# Patient Record
Sex: FEMALE | Race: WHITE | NOT HISPANIC OR LATINO | Employment: STUDENT | URBAN - METROPOLITAN AREA
[De-identification: names, ages, dates, MRNs, and addresses within clinical notes are randomized per-mention and may not be internally consistent; named-entity substitution may affect disease eponyms.]

---

## 2017-11-09 ENCOUNTER — HOSPITAL ENCOUNTER (EMERGENCY)
Facility: HOSPITAL | Age: 27
Discharge: HOME | End: 2017-11-09
Attending: EMERGENCY MEDICINE
Payer: COMMERCIAL

## 2017-11-09 VITALS
WEIGHT: 110 LBS | HEIGHT: 66 IN | DIASTOLIC BLOOD PRESSURE: 70 MMHG | TEMPERATURE: 98 F | HEART RATE: 82 BPM | BODY MASS INDEX: 17.68 KG/M2 | SYSTOLIC BLOOD PRESSURE: 119 MMHG | RESPIRATION RATE: 20 BRPM | OXYGEN SATURATION: 100 %

## 2017-11-09 DIAGNOSIS — S02.5XXA CLOSED FRACTURE OF TOOTH, INITIAL ENCOUNTER: ICD-10-CM

## 2017-11-09 DIAGNOSIS — D64.9 ANEMIA, UNSPECIFIED TYPE: ICD-10-CM
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
## 2017-11-09 DIAGNOSIS — R55 VASOVAGAL EPISODE: ICD-10-CM

## 2017-11-09 DIAGNOSIS — R55 SYNCOPE: ICD-10-CM

## 2017-11-09 DIAGNOSIS — S01.511A LIP LACERATION, INITIAL ENCOUNTER: Primary | ICD-10-CM

## 2017-11-09 LAB
ALBUMIN SERPL BCP-MCNC: 4 G/DL
ALP SERPL-CCNC: 67 U/L
ALT SERPL W/O P-5'-P-CCNC: 28 U/L
ANION GAP SERPL CALC-SCNC: 12 MMOL/L
AST SERPL-CCNC: 42 U/L
B-HCG UR QL: NEGATIVE
BASOPHILS # BLD AUTO: 0.04 K/UL
BASOPHILS NFR BLD: 0.4 %
BILIRUB SERPL-MCNC: 0.2 MG/DL
BUN SERPL-MCNC: 11 MG/DL
CALCIUM SERPL-MCNC: 9.6 MG/DL
CHLORIDE SERPL-SCNC: 108 MMOL/L
CO2 SERPL-SCNC: 17 MMOL/L
CREAT SERPL-MCNC: 0.7 MG/DL
CTP QC/QA: YES
DIFFERENTIAL METHOD: ABNORMAL
EOSINOPHIL # BLD AUTO: 0.1 K/UL
EOSINOPHIL NFR BLD: 0.9 %
ERYTHROCYTE [DISTWIDTH] IN BLOOD BY AUTOMATED COUNT: 19.9 %
EST. GFR  (AFRICAN AMERICAN): >60 ML/MIN/1.73 M^2
EST. GFR  (NON AFRICAN AMERICAN): >60 ML/MIN/1.73 M^2
GLUCOSE SERPL-MCNC: 81 MG/DL
HCT VFR BLD AUTO: 29.1 %
HGB BLD-MCNC: 8.6 G/DL
IMM GRANULOCYTES # BLD AUTO: 0.03 K/UL
IMM GRANULOCYTES NFR BLD AUTO: 0.3 %
LYMPHOCYTES # BLD AUTO: 1.2 K/UL
LYMPHOCYTES NFR BLD: 11.8 %
MCH RBC QN AUTO: 18.1 PG
MCHC RBC AUTO-ENTMCNC: 29.6 G/DL
MCV RBC AUTO: 61 FL
MONOCYTES # BLD AUTO: 0.6 K/UL
MONOCYTES NFR BLD: 5.8 %
NEUTROPHILS # BLD AUTO: 8.4 K/UL
NEUTROPHILS NFR BLD: 80.8 %
NRBC BLD-RTO: 0 /100 WBC
PLATELET # BLD AUTO: 216 K/UL
PMV BLD AUTO: ABNORMAL FL
POTASSIUM SERPL-SCNC: 4.2 MMOL/L
PROT SERPL-MCNC: 7.4 G/DL
RBC # BLD AUTO: 4.74 M/UL
SODIUM SERPL-SCNC: 137 MMOL/L
WBC # BLD AUTO: 10.35 K/UL

## 2017-11-09 PROCEDURE — 93010 ELECTROCARDIOGRAM REPORT: CPT | Mod: ,,, | Performed by: INTERNAL MEDICINE

## 2017-11-09 PROCEDURE — 96374 THER/PROPH/DIAG INJ IV PUSH: CPT

## 2017-11-09 PROCEDURE — 25000003 PHARM REV CODE 250: Performed by: STUDENT IN AN ORGANIZED HEALTH CARE EDUCATION/TRAINING PROGRAM

## 2017-11-09 PROCEDURE — 80053 COMPREHEN METABOLIC PANEL: CPT

## 2017-11-09 PROCEDURE — 81025 URINE PREGNANCY TEST: CPT | Performed by: STUDENT IN AN ORGANIZED HEALTH CARE EDUCATION/TRAINING PROGRAM

## 2017-11-09 PROCEDURE — 12001 RPR S/N/AX/GEN/TRNK 2.5CM/<: CPT | Mod: ,,, | Performed by: EMERGENCY MEDICINE

## 2017-11-09 PROCEDURE — 12011 RPR F/E/E/N/L/M 2.5 CM/<: CPT

## 2017-11-09 PROCEDURE — 85025 COMPLETE CBC W/AUTO DIFF WBC: CPT

## 2017-11-09 PROCEDURE — 99284 EMERGENCY DEPT VISIT MOD MDM: CPT | Mod: 25

## 2017-11-09 PROCEDURE — 63600175 PHARM REV CODE 636 W HCPCS: Performed by: STUDENT IN AN ORGANIZED HEALTH CARE EDUCATION/TRAINING PROGRAM

## 2017-11-09 PROCEDURE — 99284 EMERGENCY DEPT VISIT MOD MDM: CPT | Mod: 25,,, | Performed by: EMERGENCY MEDICINE

## 2017-11-09 RX ORDER — FERROUS SULFATE 325(65) MG
325 TABLET ORAL DAILY
Qty: 30 TABLET | Refills: 0 | Status: SHIPPED | OUTPATIENT
Start: 2017-11-09 | End: 2019-04-10

## 2017-11-09 RX ORDER — LIDOCAINE HYDROCHLORIDE 10 MG/ML
5 INJECTION INFILTRATION; PERINEURAL ONCE
Status: COMPLETED | OUTPATIENT
Start: 2017-11-09 | End: 2017-11-09

## 2017-11-09 RX ORDER — HYDROCODONE BITARTRATE AND ACETAMINOPHEN 5; 325 MG/1; MG/1
1 TABLET ORAL EVERY 6 HOURS PRN
Qty: 12 TABLET | Refills: 0 | Status: SHIPPED | OUTPATIENT
Start: 2017-11-09 | End: 2019-04-10

## 2017-11-09 RX ORDER — MORPHINE SULFATE 4 MG/ML
4 INJECTION, SOLUTION INTRAMUSCULAR; INTRAVENOUS
Status: COMPLETED | OUTPATIENT
Start: 2017-11-09 | End: 2017-11-09

## 2017-11-09 RX ORDER — IBUPROFEN 600 MG/1
600 TABLET ORAL EVERY 6 HOURS PRN
Qty: 20 TABLET | Refills: 0 | Status: SHIPPED | OUTPATIENT
Start: 2017-11-09 | End: 2019-04-10

## 2017-11-09 RX ORDER — LIDOCAINE HYDROCHLORIDE 20 MG/ML
5 INJECTION, SOLUTION EPIDURAL; INFILTRATION; INTRACAUDAL; PERINEURAL
Status: DISCONTINUED | OUTPATIENT
Start: 2017-11-09 | End: 2017-11-09

## 2017-11-09 RX ORDER — MUPIROCIN 20 MG/G
1 OINTMENT TOPICAL
Status: COMPLETED | OUTPATIENT
Start: 2017-11-09 | End: 2017-11-09

## 2017-11-09 RX ORDER — AMOXICILLIN 875 MG/1
875 TABLET, FILM COATED ORAL 2 TIMES DAILY
Qty: 14 TABLET | Refills: 0 | Status: SHIPPED | OUTPATIENT
Start: 2017-11-09 | End: 2019-04-10

## 2017-11-09 RX ADMIN — LIDOCAINE HYDROCHLORIDE 5 ML: 10 INJECTION, SOLUTION INFILTRATION; PERINEURAL at 05:11

## 2017-11-09 RX ADMIN — MORPHINE SULFATE 4 MG: 4 INJECTION INTRAVENOUS at 04:11

## 2017-11-09 RX ADMIN — MUPIROCIN 22 G: 20 OINTMENT TOPICAL at 05:11

## 2017-11-09 NOTE — ED TRIAGE NOTES
Pt woke up from sharp RLQ pain,got up and went to the bathroom,on her way to her bedroom she blacked out and hit her on the coffee table. +lacertaion on her upper lip      LOC: The patient is awake, alert, aware of environment with an appropriate affect. Oriented x4, speaking appropriately  APPEARANCE: Pt resting comfortably, in no acute distress, pt is clean and well groomed, clothing properly fastened  SKIN:The skin is warm and dry, color consistent with ethnicity, patient has normal skin turgor and moist mucus membranes, laceration on her upper lip  RESPIRATORY:Airway is open and patent, respirations are spontaneous, patient has a normal effort and rate, no accessory muscle use noted.  CARDIAC: Normal rate and rhythm, no peripheral edema noted, capillary refill < 3 seconds, bilateral radial pulses 2+.  ABDOMEN: Soft, non tender, non distended.   NEUROLOGIC: PERRLA, facial expression is symmetrical, patient moving all extremities spontaneously, normal sensation in all extremities when touched with a finger.  Follows all commands appropriately  MUSCULOSKELETAL: Patient moving all extremities spontaneously, no obvious swelling or deformities noted.

## 2017-11-09 NOTE — ED PROVIDER NOTES
Encounter Date: 11/9/2017       History     Chief Complaint   Patient presents with    Fall     Fell tonight while walking to the bathroom. Pt states that she blacked out while she was walking, fell and cut her lip on her coffee table and knocked out two teeth.     Abdominal Pain     26 year old woman with no significant medical history presenting for a reported syncopal episode resulting in a fall with trauma to the face. The patient reported that she woke up overnight with sudden and severe pain in the right lower abdomen and suprapubic area. She arose from bed and the pain grew worse. She suddenly became dizzy and lightheaded and then fell down. Her right upper lip and right maxillary teeth made contact with the edge of a coffee table. The patient remained down for an unclear period of time. She no longer complains of abdominal pain at this time. She is distressed about her broken teeth and lip laceration. She has not experienced a syncope before and has no seizure history. She stated that she has not been sexually active. Her last menstrual period occurred approximately 3 weeks ago. She reported that she has strong menstrual cramps, but that the pain she experienced was far more severe and short-lasting. She denies fevers/chills, nausea/vomiting, recent illness, and food exposure.          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   HENT: Negative for  congestion.    Eyes: Negative for visual disturbance.   Respiratory: Negative for shortness of breath.    Cardiovascular: Negative for chest pain and palpitations.   Gastrointestinal: Negative for abdominal pain and nausea.   Endocrine: Negative for polyuria.   Genitourinary: Negative for difficulty urinating.   Musculoskeletal: Negative for arthralgias.   Skin: Positive for wound.   Neurological: Positive for dizziness.   Psychiatric/Behavioral: Negative for agitation and behavioral problems.       Physical Exam     Initial Vitals [11/09/17 0311]   BP Pulse Resp Temp SpO2   -- (!) 113 18 98.4 °F (36.9 °C) 99 %      MAP       --         Physical Exam    Nursing note and vitals reviewed.  Constitutional: She appears well-developed and well-nourished. She is not diaphoretic. She appears distressed.   Distressed appearing 26 year old  woman.   HENT:   2.0 cm laceration to right maxillary lip.  Right central incisor broken in half.  Right lateral incisor missing.  Right canine missing.    Areas of laceration and broken teeth hemostatic at this time. No evidence of foreign bodies in mouth.   Eyes: Conjunctivae and EOM are normal. Pupils are equal, round, and reactive to light. Right eye exhibits no discharge. Left eye exhibits no discharge.   Neck: Normal range of motion. Neck supple.   Cardiovascular: Normal rate, regular rhythm, normal heart sounds and intact distal pulses.   Pulmonary/Chest: Breath sounds normal. No respiratory distress. She has no wheezes.   Abdominal: Soft. Bowel sounds are normal. She exhibits no distension.   No tenderness to palpation in right lower quadrant, left lower quadrant, or suprapubic area. Abdomen soft. Bowel sounds normal.   Musculoskeletal: Normal range of motion. She exhibits no edema or tenderness.         ED Course   Lac Repair  Date/Time: 11/9/2017 5:12 AM  Performed by: SHANTE DC  Authorized by: LAKESHA LACEY   Consent Done: Not Needed  Location: Maxillary  lip (right)  Laceration length: 1.5 cm  Foreign bodies: no foreign bodies  Tendon involvement: none  Nerve involvement: none  Vascular damage: no  Anesthesia: local infiltration    Anesthesia:  Local Anesthetic: lidocaine 1% without epinephrine  Anesthetic total: 2 mL  Patient sedated: no  Preparation: Patient was prepped and draped in the usual sterile fashion.  Irrigation solution: saline  Amount of cleaning: standard  Wound skin closure material used: 5-0 chromic.  Number of sutures: 4  Technique: simple  Approximation: close  Approximation difficulty: simple  Dressing: antibiotic ointment  Patient tolerance: Patient tolerated the procedure well with no immediate complications        Labs Reviewed   CBC W/ AUTO DIFFERENTIAL   COMPREHENSIVE METABOLIC PANEL   POCT URINE PREGNANCY             Medical Decision Making:   Initial Assessment:   26 year old woman with no significant past medical history presenting for reported syncopal episode induced by sudden suprapubic/right lower quadrant abdominal pain. Patient sustained facial trauma as noted above. CT head and max/face pending. Lidocaine with epinephrine ordered for lip laceration. CBC, CMP, ECG, and UPT pending. Presentation concerning for lip laceration vs. Several broken teeth vs. Facial bone fracture vs. Ruptured ovarian cyst vs. Pregnancy. Awaiting labs and imaging. Laceration to be repaired shortly.    Manny Carey MD  [facility]  11/09/2017; 0401  Differential Diagnosis:   CT max/face demonstrated small retained foreign body in soft tissue of right maxilla that could not be visualized during laceration repair. Patient advised that retained foreign body could not be visualized and that it most likely would be walled off with time. Patient voiced understanding. Patient advised regarding negative findings on CT head. Patient advised furthermore that she is mildly anemic according to her H/H and that this may be secondary to menstruation. She was given  prescriptions for PO Iron Sulfate, PO Motrin, PO Norco, and PO Amoxicillin with ambulatory referral placed to dental service. Patient advised that the etiology of her sudden and fleeting right adnexal pain may have been a ruptured ovarian cyst. Her syncope was likely a vasovagal response secondary to her pain. Patient voiced understanding. Patient will be discharged at this time to home in stable condition.    Manny Carey MD  \[facility]\"" EM  11/09/2017; 0538              Attending Attestation:   Physician Attestation Statement for Resident:  As the supervising MD   Physician Attestation Statement: I have personally seen and examined this patient.   I agree with the above history. -:   As the supervising MD I agree with the above PE.    As the supervising MD I agree with the above treatment, course, plan, and disposition.  I was personally present during the critical portions of the procedure(s) performed by the resident and was immediately available in the ED to provide services and assistance as needed during the entire procedure.                    ED Course      Clinical Impression:   The primary encounter diagnosis was Lip laceration, initial encounter. Diagnoses of Syncope and Closed fracture of tooth, initial encounter were also pertinent to this visit.                           Manny Carey MD  Resident  11/09/17 0539       Manny Carey MD  Resident  11/09/17 0540       Manny Carey MD  Resident  11/09/17 0548       Tobias Christopher MD  11/09/17 3184

## 2017-11-09 NOTE — PROVIDER PROGRESS NOTES - EMERGENCY DEPT.
Encounter Date: 11/9/2017    ED Physician Progress Notes           Resident Attestation:   Patient with pain consistent with ruptured ovarian cyst, subsequent syncopal episode consistent with vasovagal. No abdominal pain now. C/o dental fracture and facial laceration.   On exam, benign abdomen, clean laceration to her right upper lip.   Reviewed studies. She is anemic, likely from menstruation. No hx to suggest GI bleed. She has a very small retained foreign body to her lip. I reviewed the findings with the patient and she understands to return with worsening symptoms and follow up with dentist. Will start on iron.     I, Analia Osorio, am scribing for, and in the presence of, Dr. Christopher. I performed the above scribed service and the documentation accurately describes the services I performed. I attest to the accuracy of the note.

## 2019-04-10 ENCOUNTER — OFFICE VISIT (OUTPATIENT)
Dept: URGENT CARE | Facility: CLINIC | Age: 29
End: 2019-04-10

## 2019-04-10 VITALS
HEIGHT: 66 IN | DIASTOLIC BLOOD PRESSURE: 72 MMHG | SYSTOLIC BLOOD PRESSURE: 115 MMHG | WEIGHT: 110 LBS | OXYGEN SATURATION: 100 % | RESPIRATION RATE: 19 BRPM | TEMPERATURE: 98 F | HEART RATE: 110 BPM | BODY MASS INDEX: 17.68 KG/M2

## 2019-04-10 DIAGNOSIS — L02.416 ABSCESS OF LEFT THIGH: Primary | ICD-10-CM

## 2019-04-10 PROCEDURE — 99214 OFFICE O/P EST MOD 30 MIN: CPT | Mod: S$GLB,,, | Performed by: FAMILY MEDICINE

## 2019-04-10 PROCEDURE — 99214 PR OFFICE/OUTPT VISIT, EST, LEVL IV, 30-39 MIN: ICD-10-PCS | Mod: S$GLB,,, | Performed by: FAMILY MEDICINE

## 2019-04-10 RX ORDER — FLUOXETINE HYDROCHLORIDE 20 MG/1
20 CAPSULE ORAL DAILY
Status: ON HOLD | COMMUNITY
End: 2021-11-25 | Stop reason: HOSPADM

## 2019-04-10 RX ORDER — MUPIROCIN 20 MG/G
OINTMENT TOPICAL 3 TIMES DAILY
Qty: 1 TUBE | Refills: 0 | Status: SHIPPED | OUTPATIENT
Start: 2019-04-10 | End: 2019-04-20

## 2019-04-10 RX ORDER — SULFAMETHOXAZOLE AND TRIMETHOPRIM 800; 160 MG/1; MG/1
1 TABLET ORAL 2 TIMES DAILY
Qty: 20 TABLET | Refills: 0 | Status: SHIPPED | OUTPATIENT
Start: 2019-04-10 | End: 2019-04-20

## 2019-04-10 RX ORDER — IBUPROFEN 800 MG/1
800 TABLET ORAL 3 TIMES DAILY
Qty: 30 TABLET | Refills: 0 | Status: SHIPPED | OUTPATIENT
Start: 2019-04-10 | End: 2019-04-20

## 2019-04-11 NOTE — PATIENT INSTRUCTIONS
Abscess (Incision & Drainage)  An abscess is sometimes called a boil. It happens when bacteria get trapped under the skin and start to grow. Pus forms inside the abscess as the body responds to the bacteria. An abscess can happen with an insect bite, ingrown hair, blocked oil gland, pimple, cyst, or puncture wound.  Your healthcare provider has drained the pus from your abscess. If the abscess pocket was large, your healthcare provider may have put in gauze packing. Your provider will need to remove it on your next visit. He or she may also replace it at that time. You may not need antibiotics to treat a simple abscess, unless the infection is spreading into the skin around the wound (cellulitis).  The wound will take about 1 to 2 weeks to heal, depending on the size of the abscess. Healthy tissue will grow from the bottom and sides of the opening until it seals over.  Home care  These tips can help your wound heal:  · The wound may drain for the first 2 days. Cover the wound with a clean dry dressing. Change the dressing if it becomes soaked with blood or pus.  · If a gauze packing was placed inside the abscess pocket, you may be told to remove it yourself. You may do this in the shower. Once the packing is removed, you should wash the area in the shower, or clean the area as directed by your provider. Continue to do this until the skin opening has closed. Make sure you wash your hands after changing the packing or cleaning the wound.  · If you were prescribed antibiotics, take them as directed until they are all gone.  · You may use acetaminophen or ibuprofen to control pain, unless another pain medicine was prescribed. If you have liver disease or ever had a stomach ulcer, talk with your doctor before using these medicines.  Follow-up care  Follow up with your healthcare provider, or as advised. If a gauze packing was put in your wound, it should be removed in 1 to 2 days. Check your wound every day for any  signs that the infection is getting worse. The signs are listed below.  When to seek medical advice  Call your healthcare provider right away if any of these occur:  · Increasing redness or swelling  · Red streaks in the skin leading away from the wound  · Increasing local pain or swelling  · Continued pus draining from the wound 2 days after treatment  · Fever of 100.4ºF (38ºC) or higher, or as directed by your healthcare provider  · Boil returns when you are at home  Date Last Reviewed: 9/1/2016  © 6821-2731 [employer]. [address] [place] [id number]. All rights reserved. This information is not intended as a substitute for professional medical care. Always follow your healthcare professional's instructions.    Follow up in 2 days.    Reggie Lombardi MD

## 2019-04-11 NOTE — PROGRESS NOTES
"Subjective:       Patient ID: Georgia Lares is a 28 y.o. female.    Vitals:  height is 5' 6" (1.676 m) and weight is 49.9 kg (110 lb). Her oral temperature is 98.2 °F (36.8 °C). Her blood pressure is 115/72 and her pulse is 110. Her respiration is 19 and oxygen saturation is 100%.     Chief Complaint: Cyst    Large red cyst on upper left leg. It has recently been bleeding on its own.    Cyst   This is a new problem. The current episode started in the past 7 days (2-3 days). The problem occurs constantly. The problem has been gradually worsening. Pertinent negatives include no abdominal pain, anorexia, arthralgias, change in bowel habit, chest pain, chills, congestion, coughing, diaphoresis, fatigue, fever, headaches, joint swelling, myalgias, nausea, neck pain, numbness, rash, sore throat, swollen glands, urinary symptoms, vertigo, visual change, vomiting or weakness. The symptoms are aggravated by walking (Sitting). She has tried nothing for the symptoms. The treatment provided no relief.       Constitution: Negative for chills, sweating, fatigue and fever.   HENT: Negative for congestion and sore throat.    Neck: Negative for neck pain and painful lymph nodes.   Cardiovascular: Negative for chest pain and leg swelling.   Eyes: Negative for double vision and blurred vision.   Respiratory: Negative for cough and shortness of breath.    Gastrointestinal: Negative for abdominal pain, nausea, vomiting and diarrhea.   Genitourinary: Negative for dysuria, frequency, urgency and history of kidney stones.   Musculoskeletal: Negative for joint pain, joint swelling, muscle cramps and muscle ache.   Skin: Negative for color change, pale, rash and bruising.   Allergic/Immunologic: Negative for seasonal allergies.   Neurological: Negative for dizziness, history of vertigo, light-headedness, passing out, headaches and numbness.   Hematologic/Lymphatic: Negative for swollen lymph nodes.   Psychiatric/Behavioral: Negative " for nervous/anxious, sleep disturbance and depression. The patient is not nervous/anxious.        Objective:      Physical Exam   Constitutional: She is oriented to person, place, and time. She appears well-developed and well-nourished.   HENT:   Head: Normocephalic and atraumatic.   Eyes: Pupils are equal, round, and reactive to light. EOM are normal.   Neck: Normal range of motion. Neck supple. No thyromegaly present.   Cardiovascular: Normal rate, regular rhythm and normal heart sounds. Exam reveals no friction rub.   No murmur heard.  Pulmonary/Chest: Breath sounds normal. No respiratory distress. She has no wheezes. She has no rales.   Abdominal: Soft. Bowel sounds are normal. She exhibits no distension. There is no tenderness.   Musculoskeletal: Normal range of motion. She exhibits no edema, tenderness or deformity.   Lymphadenopathy:     She has no cervical adenopathy.   Neurological: She is alert and oriented to person, place, and time. No cranial nerve deficit. She exhibits normal muscle tone. Coordination normal.   Skin: Skin is warm. Capillary refill takes less than 2 seconds. No rash noted. No pallor.        Left thigh has a 3 cm of swelling, redness, tenderness and warm.  The center has a 1 mm opening with scant yellow drainage.   Psychiatric: She has a normal mood and affect. Her behavior is normal. Judgment and thought content normal.   Vitals reviewed.      Assessment:       1. Abscess of left thigh        Plan:         Abscess of left thigh  -     sulfamethoxazole-trimethoprim 800-160mg (BACTRIM DS) 800-160 mg Tab; Take 1 tablet by mouth 2 (two) times daily. for 10 days  Dispense: 20 tablet; Refill: 0  -     mupirocin (BACTROBAN) 2 % ointment; Apply topically 3 (three) times daily. Apply to the affected area for 10 days  Dispense: 1 Tube; Refill: 0  -     ibuprofen (ADVIL,MOTRIN) 800 MG tablet; Take 1 tablet (800 mg total) by mouth 3 (three) times daily. for 10 days  Dispense: 30 tablet; Refill:  0          Patient Instructions     Abscess (Incision & Drainage)  An abscess is sometimes called a boil. It happens when bacteria get trapped under the skin and start to grow. Pus forms inside the abscess as the body responds to the bacteria. An abscess can happen with an insect bite, ingrown hair, blocked oil gland, pimple, cyst, or puncture wound.  Your healthcare provider has drained the pus from your abscess. If the abscess pocket was large, your healthcare provider may have put in gauze packing. Your provider will need to remove it on your next visit. He or she may also replace it at that time. You may not need antibiotics to treat a simple abscess, unless the infection is spreading into the skin around the wound (cellulitis).  The wound will take about 1 to 2 weeks to heal, depending on the size of the abscess. Healthy tissue will grow from the bottom and sides of the opening until it seals over.  Home care  These tips can help your wound heal:  · The wound may drain for the first 2 days. Cover the wound with a clean dry dressing. Change the dressing if it becomes soaked with blood or pus.  · If a gauze packing was placed inside the abscess pocket, you may be told to remove it yourself. You may do this in the shower. Once the packing is removed, you should wash the area in the shower, or clean the area as directed by your provider. Continue to do this until the skin opening has closed. Make sure you wash your hands after changing the packing or cleaning the wound.  · If you were prescribed antibiotics, take them as directed until they are all gone.  · You may use acetaminophen or ibuprofen to control pain, unless another pain medicine was prescribed. If you have liver disease or ever had a stomach ulcer, talk with your doctor before using these medicines.  Follow-up care  Follow up with your healthcare provider, or as advised. If a gauze packing was put in your wound, it should be removed in 1 to 2 days.  Check your wound every day for any signs that the infection is getting worse. The signs are listed below.  When to seek medical advice  Call your healthcare provider right away if any of these occur:  · Increasing redness or swelling  · Red streaks in the skin leading away from the wound  · Increasing local pain or swelling  · Continued pus draining from the wound 2 days after treatment  · Fever of 100.4ºF (38ºC) or higher, or as directed by your healthcare provider  · Boil returns when you are at home  Date Last Reviewed: 9/1/2016 © 2000-2017 [email or web address]. [address] [place] [address]. All rights reserved. This information is not intended as a substitute for professional medical care. Always follow your healthcare professional's instructions.    Follow up in 2 days.    Reggie Lombardi MD

## 2019-04-12 ENCOUNTER — CLINICAL SUPPORT (OUTPATIENT)
Dept: URGENT CARE | Facility: CLINIC | Age: 29
End: 2019-04-12

## 2019-04-12 VITALS
TEMPERATURE: 98 F | RESPIRATION RATE: 16 BRPM | HEIGHT: 66 IN | DIASTOLIC BLOOD PRESSURE: 66 MMHG | WEIGHT: 110 LBS | OXYGEN SATURATION: 98 % | BODY MASS INDEX: 17.68 KG/M2 | HEART RATE: 97 BPM | SYSTOLIC BLOOD PRESSURE: 106 MMHG

## 2019-04-12 DIAGNOSIS — L02.31 ABSCESS OF LEFT BUTTOCK: Primary | ICD-10-CM

## 2019-04-12 PROCEDURE — 99499 UNLISTED E&M SERVICE: CPT | Mod: S$GLB,,, | Performed by: PHYSICIAN ASSISTANT

## 2019-04-12 PROCEDURE — 99499 NO LOS: ICD-10-PCS | Mod: S$GLB,,, | Performed by: PHYSICIAN ASSISTANT

## 2019-04-12 RX ORDER — CLINDAMYCIN PHOSPHATE 150 MG/ML
300 INJECTION, SOLUTION INTRAVENOUS
Status: COMPLETED | OUTPATIENT
Start: 2019-04-12 | End: 2019-04-12

## 2019-04-12 RX ADMIN — CLINDAMYCIN PHOSPHATE 300 MG: 150 INJECTION, SOLUTION INTRAVENOUS at 07:04

## 2019-04-12 NOTE — PROGRESS NOTES
"Subjective:       Patient ID: Georgia Lares is a 28 y.o. female.    Vitals:  height is 5' 6" (1.676 m) and weight is 49.9 kg (110 lb). Her oral temperature is 97.7 °F (36.5 °C). Her blood pressure is 106/66 and her pulse is 97. Her respiration is 16 and oxygen saturation is 98%.     Chief Complaint: Wound Check    Pt had an abscess under right buttocks drained on Wednesday. Patient states that she is feeling much better. Denies any fever.    Wound Check   She was originally treated 3 to 5 days ago. Previous treatment included I&D of abscess and oral antibiotics. Her temperature was unmeasured prior to arrival. There has been bloody and colored discharge from the wound. There is no redness present. There is no swelling present. The pain has improved. She has no difficulty moving the affected extremity or digit.       Constitution: Negative for fever.   Neck: Negative for painful lymph nodes.   Skin: Positive for erythema.   Hematologic/Lymphatic: Negative for swollen lymph nodes.       Objective:      Physical Exam   Constitutional: She is oriented to person, place, and time. She appears well-developed and well-nourished.   HENT:   Head: Normocephalic and atraumatic. Head is without abrasion, without contusion and without laceration.   Right Ear: External ear normal.   Left Ear: External ear normal.   Nose: Nose normal.   Mouth/Throat: Oropharynx is clear and moist.   Eyes: Pupils are equal, round, and reactive to light. Conjunctivae, EOM and lids are normal.   Neck: Trachea normal, full passive range of motion without pain and phonation normal. Neck supple.   Cardiovascular: Normal rate, regular rhythm and normal heart sounds.   Pulmonary/Chest: Effort normal and breath sounds normal. No stridor. No respiratory distress.   Musculoskeletal: Normal range of motion.   Neurological: She is alert and oriented to person, place, and time.   Skin: Skin is warm, dry and intact. Capillary refill takes less than 2 " seconds. No abrasion, no bruising, no burn, no ecchymosis, no laceration, no lesion and no rash noted. There is erythema.        Psychiatric: She has a normal mood and affect. Her speech is normal and behavior is normal. Judgment and thought content normal. Cognition and memory are normal.   Nursing note and vitals reviewed.      Assessment:       1. Abscess of left buttock        Plan:         Abscess of left buttock  -     clindamycin injection 300 mg      Patient Instructions   General Discharge Instructions    Wash with warm soap and water and apply Bactroban ointment on the affected area. Change dressings every 12hrs.    If you were prescribed antibiotics, please take them to completion.  You must understand that you've received an Urgent Care treatment only and that you may be released before all your medical problems are known or treated. You, the patient, will arrange for follow up care as instructed.  Follow up with your PCP or specialty clinic as directed in the next 1-2 weeks if not improved or as needed.  You can call [phone] to schedule an appointment with the appropriate provider.  If your condition worsens we recommend that you receive another evaluation at the emergency room immediately or contact your primary medical clinics after hours call service to discuss your concerns.  Please return here or go to the Emergency Department for any concerns or worsening of condition.      Wound Care After Packing Removal or Replacement  Packing is a special type of dressing placed inside a wound to help it heal. Once the packing is removed, you need to care for your wound. Good wound care helps prevent infection. Be sure to keep all follow-up appointments with your healthcare provider. Follow these instructions to take care of the wound once youre at home.  Home care  Your healthcare provider may prescribe medicines for pain. Or he or she may suggest an over-the-counter (OTC) pain reliever, such as  ibuprofen or acetaminophen. Talk with your healthcare provider before taking any OTC medicines if you have chronic liver or kidney disease, a stomach ulcer, or gastrointestinal bleeding. In certain cases, you may also need to take antibiotics to help prevent infection. If so, take them exactly as directed for as long as directed. Dont stop taking your antibiotics until they are all gone, even if you feel better.  Here are some general care guidelines for your wound:  · Follow your healthcare providers instructions on how to care for your wound. Always wash your hands with soap and warm water before and after tending to your wound.  · If a bandage was put on, remove and change it once a day or as directed. If the bandage gets wet or dirty, replace it as soon as possible with a new bandage. Use a clean cloth to gently pat the wound dry.  · If your packing was replaced, a small piece of gauze may hang from the wound. It allows fluid to continue draining from the wound. You may need to use an ointment or cream to keep the packing from sticking to the bandage.  · Don't bathe in a tub or soak your wound until your healthcare provider says its OK. Take showers or sponge baths instead. Avoid swimming.  · Dont scratch, rub, scrub, or pick your wound.  · Check your wound daily for the signs of infection listed below.  If your wound was closed, it was likely with one of four types of closures. These include stitches (sutures), strips of surgical tape, skin glue, and staples. Your healthcare provider will decide on the best closure based on the size and location of your wound. Each type of closure needs specific care.  · Sutures. You may want to clean the wound daily after the first 2 to 3 days. To do this, remove the bandage and gently wash the area with soap and warm water. After cleaning, apply a thin layer of antibiotic ointment if recommended. Then put on a new bandage. Sutures on the outside of the skin usually need  to be removed by your healthcare provider.  · Surgical tape. Keep the area dry. If it gets wet, blot it dry with a towel. Surgical tape closures usually fall off within 7 to 10 days. If they have not fallen off after 10 days, you can remove them yourself. To remove the tape, use mineral oil or petroleum jelly on a cotton ball to gently rub the adhesive.  · Skin glue. You may shower or bathe as usual. But do not use soaps, lotions, or ointments on the wound area. Do not scrub the wound. After bathing, pat the wound dry with a soft towel. Do not apply liquids like peroxide, ointments, or creams to the wound while the strips or film is in place. Don't scratch, rub, or pick at the strips or film. Don't put tape directly over the strips or film. Skin adhesive film will fall off naturally in 5 to 10 days. If it does not peel off in 10 days, gently rub petroleum jelly or an ointment onto the film.  · Staples. Take showers or sponge baths. Don't take tub baths. Don't use lotions on the wound area. The area may be cleaned with soap and water 2 to 3 days after the wound was stapled. Don't scrub the wound. Pat it dry with a clean soft cloth or towel. You can use antibiotic ointment if your healthcare provider tells you to. Staples will need to be removed in 10 to 14 days.  Follow-up care  Follow up with your healthcare provider, or as directed. If your packing was replaced, you may need another visit within 1 to 3 days to remove or replace it. If you have sutures or staples, return for their removal as directed.  When to seek medical advice  Call your health care provider right away if you have signs of infection:  · Fever of 1 degree or more above your normal temperature, or as directed by your healthcare provider  · Increasing pain in the wound or pain that doesnt get better even with pain medicine  · Increasing redness or swelling  · Pus or bad-smelling drainage from the wound  Also call your healthcare provider right away  if any of these occur:  · Your wound bleeds more than a small amount or wont stop bleeding.  · The edges of your wound come apart.  · You have numbness or weakness in the wound area that doesnt go away.  Date Last Reviewed: 10/2/2015  © 0087-9765 [employer] [address] [place] [address], [place] [address]. All rights reserved. This information is not intended as a substitute for professional medical care. Always follow your healthcare professional's instructions.

## 2019-04-13 NOTE — PATIENT INSTRUCTIONS
General Discharge Instructions    Wash with warm soap and water and apply Bactroban ointment on the affected area. Change dressings every 12hrs.    If you were prescribed antibiotics, please take them to completion.  You must understand that you've received an Urgent Care treatment only and that you may be released before all your medical problems are known or treated. You, the patient, will arrange for follow up care as instructed.  Follow up with your PCP or specialty clinic as directed in the next 1-2 weeks if not improved or as needed.  You can call [phone] to schedule an appointment with the appropriate provider.  If your condition worsens we recommend that you receive another evaluation at the emergency room immediately or contact your primary medical clinics after hours call service to discuss your concerns.  Please return here or go to the Emergency Department for any concerns or worsening of condition.      Wound Care After Packing Removal or Replacement  Packing is a special type of dressing placed inside a wound to help it heal. Once the packing is removed, you need to care for your wound. Good wound care helps prevent infection. Be sure to keep all follow-up appointments with your healthcare provider. Follow these instructions to take care of the wound once youre at home.  Home care  Your healthcare provider may prescribe medicines for pain. Or he or she may suggest an over-the-counter (OTC) pain reliever, such as ibuprofen or acetaminophen. Talk with your healthcare provider before taking any OTC medicines if you have chronic liver or kidney disease, a stomach ulcer, or gastrointestinal bleeding. In certain cases, you may also need to take antibiotics to help prevent infection. If so, take them exactly as directed for as long as directed. Dont stop taking your antibiotics until they are all gone, even if you feel better.  Here are some general care guidelines for your wound:  · Follow your  healthcare providers instructions on how to care for your wound. Always wash your hands with soap and warm water before and after tending to your wound.  · If a bandage was put on, remove and change it once a day or as directed. If the bandage gets wet or dirty, replace it as soon as possible with a new bandage. Use a clean cloth to gently pat the wound dry.  · If your packing was replaced, a small piece of gauze may hang from the wound. It allows fluid to continue draining from the wound. You may need to use an ointment or cream to keep the packing from sticking to the bandage.  · Don't bathe in a tub or soak your wound until your healthcare provider says its OK. Take showers or sponge baths instead. Avoid swimming.  · Dont scratch, rub, scrub, or pick your wound.  · Check your wound daily for the signs of infection listed below.  If your wound was closed, it was likely with one of four types of closures. These include stitches (sutures), strips of surgical tape, skin glue, and staples. Your healthcare provider will decide on the best closure based on the size and location of your wound. Each type of closure needs specific care.  · Sutures. You may want to clean the wound daily after the first 2 to 3 days. To do this, remove the bandage and gently wash the area with soap and warm water. After cleaning, apply a thin layer of antibiotic ointment if recommended. Then put on a new bandage. Sutures on the outside of the skin usually need to be removed by your healthcare provider.  · Surgical tape. Keep the area dry. If it gets wet, blot it dry with a towel. Surgical tape closures usually fall off within 7 to 10 days. If they have not fallen off after 10 days, you can remove them yourself. To remove the tape, use mineral oil or petroleum jelly on a cotton ball to gently rub the adhesive.  · Skin glue. You may shower or bathe as usual. But do not use soaps, lotions, or ointments on the wound area. Do not scrub the  wound. After bathing, pat the wound dry with a soft towel. Do not apply liquids like peroxide, ointments, or creams to the wound while the strips or film is in place. Don't scratch, rub, or pick at the strips or film. Don't put tape directly over the strips or film. Skin adhesive film will fall off naturally in 5 to 10 days. If it does not peel off in 10 days, gently rub petroleum jelly or an ointment onto the film.  · Staples. Take showers or sponge baths. Don't take tub baths. Don't use lotions on the wound area. The area may be cleaned with soap and water 2 to 3 days after the wound was stapled. Don't scrub the wound. Pat it dry with a clean soft cloth or towel. You can use antibiotic ointment if your healthcare provider tells you to. Staples will need to be removed in 10 to 14 days.  Follow-up care  Follow up with your healthcare provider, or as directed. If your packing was replaced, you may need another visit within 1 to 3 days to remove or replace it. If you have sutures or staples, return for their removal as directed.  When to seek medical advice  Call your health care provider right away if you have signs of infection:  · Fever of 1 degree or more above your normal temperature, or as directed by your healthcare provider  · Increasing pain in the wound or pain that doesnt get better even with pain medicine  · Increasing redness or swelling  · Pus or bad-smelling drainage from the wound  Also call your healthcare provider right away if any of these occur:  · Your wound bleeds more than a small amount or wont stop bleeding.  · The edges of your wound come apart.  · You have numbness or weakness in the wound area that doesnt go away.  Date Last Reviewed: 10/2/2015  © 9969-2761 [employer]. [address] [place] [address], [place] [address]. All rights reserved. This information is not intended as a substitute for professional medical care. Always follow your healthcare professional's  instructions.

## 2021-11-22 ENCOUNTER — HOSPITAL ENCOUNTER (INPATIENT)
Facility: HOSPITAL | Age: 31
LOS: 3 days | Discharge: HOME | DRG: 872 | End: 2021-11-25
Attending: EMERGENCY MEDICINE | Admitting: EMERGENCY MEDICINE

## 2021-11-22 DIAGNOSIS — N10 PYELONEPHRITIS, ACUTE: Primary | ICD-10-CM

## 2021-11-22 DIAGNOSIS — R50.9 FEVER: ICD-10-CM

## 2021-11-22 DIAGNOSIS — R00.1 BRADYCARDIA: ICD-10-CM

## 2021-11-22 DIAGNOSIS — D64.9 ANEMIA, UNSPECIFIED TYPE: ICD-10-CM

## 2021-11-22 PROBLEM — A41.9 SEVERE SEPSIS: Status: ACTIVE | Noted: 2021-11-22

## 2021-11-22 PROBLEM — R65.20 SEVERE SEPSIS: Status: ACTIVE | Noted: 2021-11-22

## 2021-11-22 LAB
ABO + RH BLD: NORMAL
ALBUMIN SERPL BCP-MCNC: 3.2 G/DL (ref 3.5–5.2)
ALP SERPL-CCNC: 76 U/L (ref 55–135)
ALT SERPL W/O P-5'-P-CCNC: 31 U/L (ref 10–44)
ANION GAP SERPL CALC-SCNC: 13 MMOL/L (ref 8–16)
ANISOCYTOSIS BLD QL SMEAR: ABNORMAL
APTT BLDCRRT: 31.3 SEC (ref 21–32)
AST SERPL-CCNC: 31 U/L (ref 10–40)
B-HCG UR QL: NEGATIVE
BACTERIA #/AREA URNS AUTO: ABNORMAL /HPF
BASOPHILS # BLD AUTO: 0.02 K/UL (ref 0–0.2)
BASOPHILS NFR BLD: 0.2 % (ref 0–1.9)
BILIRUB SERPL-MCNC: 0.5 MG/DL (ref 0.1–1)
BILIRUB UR QL STRIP: NEGATIVE
BLD GP AB SCN CELLS X3 SERPL QL: NORMAL
BUN SERPL-MCNC: 14 MG/DL (ref 6–20)
BURR CELLS BLD QL SMEAR: ABNORMAL
CALCIUM SERPL-MCNC: 8.5 MG/DL (ref 8.7–10.5)
CHLORIDE SERPL-SCNC: 104 MMOL/L (ref 95–110)
CLARITY UR REFRACT.AUTO: ABNORMAL
CO2 SERPL-SCNC: 18 MMOL/L (ref 23–29)
COLOR UR AUTO: YELLOW
CREAT SERPL-MCNC: 0.8 MG/DL (ref 0.5–1.4)
CTP QC/QA: YES
DACRYOCYTES BLD QL SMEAR: ABNORMAL
DIFFERENTIAL METHOD: ABNORMAL
EOSINOPHIL # BLD AUTO: 0 K/UL (ref 0–0.5)
EOSINOPHIL NFR BLD: 0 % (ref 0–8)
ERYTHROCYTE [DISTWIDTH] IN BLOOD BY AUTOMATED COUNT: 22.9 % (ref 11.5–14.5)
EST. GFR  (AFRICAN AMERICAN): >60 ML/MIN/1.73 M^2
EST. GFR  (NON AFRICAN AMERICAN): >60 ML/MIN/1.73 M^2
FERRITIN SERPL-MCNC: 47 NG/ML (ref 20–300)
FIBRINOGEN PPP-MCNC: 217 MG/DL (ref 182–400)
FOLATE SERPL-MCNC: 11.3 NG/ML (ref 4–24)
GIANT PLATELETS BLD QL SMEAR: PRESENT
GLUCOSE SERPL-MCNC: 105 MG/DL (ref 70–110)
GLUCOSE UR QL STRIP: NEGATIVE
HCT VFR BLD AUTO: 15.6 % (ref 37–48.5)
HCT VFR BLD AUTO: 17.4 % (ref 37–48.5)
HCV AB SERPL QL IA: NEGATIVE
HGB BLD-MCNC: 4.2 G/DL (ref 12–16)
HGB BLD-MCNC: 4.5 G/DL (ref 12–16)
HGB UR QL STRIP: ABNORMAL
HIV 1+2 AB+HIV1 P24 AG SERPL QL IA: NEGATIVE
HYALINE CASTS UR QL AUTO: 0 /LPF
HYPOCHROMIA BLD QL SMEAR: ABNORMAL
IMM GRANULOCYTES # BLD AUTO: 0.1 K/UL (ref 0–0.04)
IMM GRANULOCYTES NFR BLD AUTO: 0.9 % (ref 0–0.5)
INR PPP: 1 (ref 0.8–1.2)
IRON SERPL-MCNC: <10 UG/DL (ref 30–160)
KETONES UR QL STRIP: NEGATIVE
LACTATE SERPL-SCNC: 2.3 MMOL/L (ref 0.5–2.2)
LACTATE SERPL-SCNC: 3 MMOL/L (ref 0.5–2.2)
LEUKOCYTE ESTERASE UR QL STRIP: ABNORMAL
LYMPHOCYTES # BLD AUTO: 0.3 K/UL (ref 1–4.8)
LYMPHOCYTES NFR BLD: 2.6 % (ref 18–48)
MCH RBC QN AUTO: 14.6 PG (ref 27–31)
MCHC RBC AUTO-ENTMCNC: 25.9 G/DL (ref 32–36)
MCV RBC AUTO: 57 FL (ref 82–98)
MICROSCOPIC COMMENT: ABNORMAL
MONOCYTES # BLD AUTO: 0.5 K/UL (ref 0.3–1)
MONOCYTES NFR BLD: 4.9 % (ref 4–15)
NEUTROPHILS # BLD AUTO: 9.7 K/UL (ref 1.8–7.7)
NEUTROPHILS NFR BLD: 91.4 % (ref 38–73)
NITRITE UR QL STRIP: NEGATIVE
NRBC BLD-RTO: 0 /100 WBC
OVALOCYTES BLD QL SMEAR: ABNORMAL
PH UR STRIP: 5 [PH] (ref 5–8)
PLATELET # BLD AUTO: 276 K/UL (ref 150–450)
PLATELET BLD QL SMEAR: ABNORMAL
PMV BLD AUTO: ABNORMAL FL (ref 9.2–12.9)
POC MOLECULAR INFLUENZA A AGN: NEGATIVE
POC MOLECULAR INFLUENZA B AGN: NEGATIVE
POIKILOCYTOSIS BLD QL SMEAR: ABNORMAL
POLYCHROMASIA BLD QL SMEAR: ABNORMAL
POTASSIUM SERPL-SCNC: 3.3 MMOL/L (ref 3.5–5.1)
PROT SERPL-MCNC: 6.1 G/DL (ref 6–8.4)
PROT UR QL STRIP: ABNORMAL
PROTHROMBIN TIME: 11.2 SEC (ref 9–12.5)
RBC # BLD AUTO: 3.08 M/UL (ref 4–5.4)
RBC #/AREA URNS AUTO: >100 /HPF (ref 0–4)
SARS-COV-2 RDRP RESP QL NAA+PROBE: NEGATIVE
SATURATED IRON: ABNORMAL % (ref 20–50)
SCHISTOCYTES BLD QL SMEAR: ABNORMAL
SCHISTOCYTES BLD QL SMEAR: PRESENT
SODIUM SERPL-SCNC: 135 MMOL/L (ref 136–145)
SP GR UR STRIP: 1.02 (ref 1–1.03)
SPHEROCYTES BLD QL SMEAR: ABNORMAL
SQUAMOUS #/AREA URNS AUTO: 3 /HPF
TARGETS BLD QL SMEAR: ABNORMAL
TOTAL IRON BINDING CAPACITY: 397 UG/DL (ref 250–450)
TRANSFERRIN SERPL-MCNC: 268 MG/DL (ref 200–375)
TSH SERPL DL<=0.005 MIU/L-ACNC: 1.13 UIU/ML (ref 0.4–4)
URN SPEC COLLECT METH UR: ABNORMAL
VIT B12 SERPL-MCNC: 792 PG/ML (ref 210–950)
WBC # BLD AUTO: 10.57 K/UL (ref 3.9–12.7)
WBC #/AREA URNS AUTO: >100 /HPF (ref 0–5)
WBC CLUMPS UR QL AUTO: ABNORMAL

## 2021-11-22 PROCEDURE — 36430 TRANSFUSION BLD/BLD COMPNT: CPT

## 2021-11-22 PROCEDURE — 85397 CLOTTING FUNCT ACTIVITY: CPT | Performed by: STUDENT IN AN ORGANIZED HEALTH CARE EDUCATION/TRAINING PROGRAM

## 2021-11-22 PROCEDURE — 86920 COMPATIBILITY TEST SPIN: CPT | Performed by: STUDENT IN AN ORGANIZED HEALTH CARE EDUCATION/TRAINING PROGRAM

## 2021-11-22 PROCEDURE — 82746 ASSAY OF FOLIC ACID SERUM: CPT | Performed by: STUDENT IN AN ORGANIZED HEALTH CARE EDUCATION/TRAINING PROGRAM

## 2021-11-22 PROCEDURE — 86803 HEPATITIS C AB TEST: CPT | Performed by: EMERGENCY MEDICINE

## 2021-11-22 PROCEDURE — 81001 URINALYSIS AUTO W/SCOPE: CPT | Performed by: STUDENT IN AN ORGANIZED HEALTH CARE EDUCATION/TRAINING PROGRAM

## 2021-11-22 PROCEDURE — 87086 URINE CULTURE/COLONY COUNT: CPT | Performed by: STUDENT IN AN ORGANIZED HEALTH CARE EDUCATION/TRAINING PROGRAM

## 2021-11-22 PROCEDURE — P9021 RED BLOOD CELLS UNIT: HCPCS | Performed by: STUDENT IN AN ORGANIZED HEALTH CARE EDUCATION/TRAINING PROGRAM

## 2021-11-22 PROCEDURE — 85025 COMPLETE CBC W/AUTO DIFF WBC: CPT | Performed by: STUDENT IN AN ORGANIZED HEALTH CARE EDUCATION/TRAINING PROGRAM

## 2021-11-22 PROCEDURE — 83605 ASSAY OF LACTIC ACID: CPT | Performed by: STUDENT IN AN ORGANIZED HEALTH CARE EDUCATION/TRAINING PROGRAM

## 2021-11-22 PROCEDURE — U0002 COVID-19 LAB TEST NON-CDC: HCPCS | Performed by: STUDENT IN AN ORGANIZED HEALTH CARE EDUCATION/TRAINING PROGRAM

## 2021-11-22 PROCEDURE — 25000003 PHARM REV CODE 250: Performed by: HOSPITALIST

## 2021-11-22 PROCEDURE — 85730 THROMBOPLASTIN TIME PARTIAL: CPT | Performed by: STUDENT IN AN ORGANIZED HEALTH CARE EDUCATION/TRAINING PROGRAM

## 2021-11-22 PROCEDURE — 25000003 PHARM REV CODE 250: Performed by: STUDENT IN AN ORGANIZED HEALTH CARE EDUCATION/TRAINING PROGRAM

## 2021-11-22 PROCEDURE — 96365 THER/PROPH/DIAG IV INF INIT: CPT

## 2021-11-22 PROCEDURE — 99900035 HC TECH TIME PER 15 MIN (STAT)

## 2021-11-22 PROCEDURE — 96360 HYDRATION IV INFUSION INIT: CPT | Mod: 59

## 2021-11-22 PROCEDURE — 20600001 HC STEP DOWN PRIVATE ROOM

## 2021-11-22 PROCEDURE — 85018 HEMOGLOBIN: CPT | Performed by: STUDENT IN AN ORGANIZED HEALTH CARE EDUCATION/TRAINING PROGRAM

## 2021-11-22 PROCEDURE — 63600175 PHARM REV CODE 636 W HCPCS: Performed by: HOSPITALIST

## 2021-11-22 PROCEDURE — 87077 CULTURE AEROBIC IDENTIFY: CPT | Performed by: STUDENT IN AN ORGANIZED HEALTH CARE EDUCATION/TRAINING PROGRAM

## 2021-11-22 PROCEDURE — 85014 HEMATOCRIT: CPT | Performed by: STUDENT IN AN ORGANIZED HEALTH CARE EDUCATION/TRAINING PROGRAM

## 2021-11-22 PROCEDURE — 87389 HIV-1 AG W/HIV-1&-2 AB AG IA: CPT | Performed by: EMERGENCY MEDICINE

## 2021-11-22 PROCEDURE — 84466 ASSAY OF TRANSFERRIN: CPT | Performed by: STUDENT IN AN ORGANIZED HEALTH CARE EDUCATION/TRAINING PROGRAM

## 2021-11-22 PROCEDURE — 86900 BLOOD TYPING SEROLOGIC ABO: CPT | Performed by: STUDENT IN AN ORGANIZED HEALTH CARE EDUCATION/TRAINING PROGRAM

## 2021-11-22 PROCEDURE — 82607 VITAMIN B-12: CPT | Performed by: STUDENT IN AN ORGANIZED HEALTH CARE EDUCATION/TRAINING PROGRAM

## 2021-11-22 PROCEDURE — 80053 COMPREHEN METABOLIC PANEL: CPT | Performed by: STUDENT IN AN ORGANIZED HEALTH CARE EDUCATION/TRAINING PROGRAM

## 2021-11-22 PROCEDURE — 87088 URINE BACTERIA CULTURE: CPT | Performed by: STUDENT IN AN ORGANIZED HEALTH CARE EDUCATION/TRAINING PROGRAM

## 2021-11-22 PROCEDURE — 85384 FIBRINOGEN ACTIVITY: CPT | Performed by: STUDENT IN AN ORGANIZED HEALTH CARE EDUCATION/TRAINING PROGRAM

## 2021-11-22 PROCEDURE — 63600175 PHARM REV CODE 636 W HCPCS: Performed by: STUDENT IN AN ORGANIZED HEALTH CARE EDUCATION/TRAINING PROGRAM

## 2021-11-22 PROCEDURE — 87502 INFLUENZA DNA AMP PROBE: CPT

## 2021-11-22 PROCEDURE — 87040 BLOOD CULTURE FOR BACTERIA: CPT | Mod: 59 | Performed by: STUDENT IN AN ORGANIZED HEALTH CARE EDUCATION/TRAINING PROGRAM

## 2021-11-22 PROCEDURE — 63600175 PHARM REV CODE 636 W HCPCS: Performed by: EMERGENCY MEDICINE

## 2021-11-22 PROCEDURE — 85240 CLOT FACTOR VIII AHG 1 STAGE: CPT | Performed by: STUDENT IN AN ORGANIZED HEALTH CARE EDUCATION/TRAINING PROGRAM

## 2021-11-22 PROCEDURE — 82728 ASSAY OF FERRITIN: CPT | Performed by: STUDENT IN AN ORGANIZED HEALTH CARE EDUCATION/TRAINING PROGRAM

## 2021-11-22 PROCEDURE — 93005 ELECTROCARDIOGRAM TRACING: CPT

## 2021-11-22 PROCEDURE — 99291 PR CRITICAL CARE, E/M 30-74 MINUTES: ICD-10-PCS | Mod: CR,CS,, | Performed by: EMERGENCY MEDICINE

## 2021-11-22 PROCEDURE — 84443 ASSAY THYROID STIM HORMONE: CPT | Performed by: STUDENT IN AN ORGANIZED HEALTH CARE EDUCATION/TRAINING PROGRAM

## 2021-11-22 PROCEDURE — 99291 CRITICAL CARE FIRST HOUR: CPT | Mod: 25

## 2021-11-22 PROCEDURE — 99223 PR INITIAL HOSPITAL CARE,LEVL III: ICD-10-PCS | Mod: ,,, | Performed by: OBSTETRICS & GYNECOLOGY

## 2021-11-22 PROCEDURE — 85246 CLOT FACTOR VIII VW ANTIGEN: CPT | Performed by: STUDENT IN AN ORGANIZED HEALTH CARE EDUCATION/TRAINING PROGRAM

## 2021-11-22 PROCEDURE — 85610 PROTHROMBIN TIME: CPT | Performed by: STUDENT IN AN ORGANIZED HEALTH CARE EDUCATION/TRAINING PROGRAM

## 2021-11-22 PROCEDURE — 99291 CRITICAL CARE FIRST HOUR: CPT | Mod: CR,CS,, | Performed by: EMERGENCY MEDICINE

## 2021-11-22 PROCEDURE — 87186 SC STD MICRODIL/AGAR DIL: CPT | Performed by: STUDENT IN AN ORGANIZED HEALTH CARE EDUCATION/TRAINING PROGRAM

## 2021-11-22 PROCEDURE — 93010 EKG 12-LEAD: ICD-10-PCS | Mod: ,,, | Performed by: INTERNAL MEDICINE

## 2021-11-22 PROCEDURE — 81025 URINE PREGNANCY TEST: CPT | Performed by: EMERGENCY MEDICINE

## 2021-11-22 PROCEDURE — 93010 ELECTROCARDIOGRAM REPORT: CPT | Mod: ,,, | Performed by: INTERNAL MEDICINE

## 2021-11-22 PROCEDURE — 99223 1ST HOSP IP/OBS HIGH 75: CPT | Mod: ,,, | Performed by: OBSTETRICS & GYNECOLOGY

## 2021-11-22 RX ORDER — HYDROMORPHONE HYDROCHLORIDE 1 MG/ML
1 INJECTION, SOLUTION INTRAMUSCULAR; INTRAVENOUS; SUBCUTANEOUS EVERY 4 HOURS PRN
Status: DISCONTINUED | OUTPATIENT
Start: 2021-11-22 | End: 2021-11-25

## 2021-11-22 RX ORDER — ACETAMINOPHEN 650 MG/20.3ML
650 LIQUID ORAL EVERY 6 HOURS PRN
Status: DISCONTINUED | OUTPATIENT
Start: 2021-11-22 | End: 2021-11-25

## 2021-11-22 RX ORDER — ENOXAPARIN SODIUM 100 MG/ML
40 INJECTION SUBCUTANEOUS EVERY 24 HOURS
Status: DISCONTINUED | OUTPATIENT
Start: 2021-11-22 | End: 2021-11-22

## 2021-11-22 RX ORDER — IBUPROFEN 600 MG/1
600 TABLET ORAL EVERY 6 HOURS PRN
Status: DISCONTINUED | OUTPATIENT
Start: 2021-11-22 | End: 2021-11-22

## 2021-11-22 RX ORDER — ONDANSETRON 4 MG/1
8 TABLET, ORALLY DISINTEGRATING ORAL EVERY 8 HOURS PRN
Status: DISCONTINUED | OUTPATIENT
Start: 2021-11-22 | End: 2021-11-25 | Stop reason: HOSPADM

## 2021-11-22 RX ORDER — ACETAMINOPHEN 500 MG
1000 TABLET ORAL
Status: COMPLETED | OUTPATIENT
Start: 2021-11-22 | End: 2021-11-22

## 2021-11-22 RX ORDER — ACETAMINOPHEN 325 MG/1
650 TABLET ORAL EVERY 6 HOURS PRN
Status: DISCONTINUED | OUTPATIENT
Start: 2021-11-22 | End: 2021-11-22

## 2021-11-22 RX ORDER — SODIUM CHLORIDE 9 MG/ML
INJECTION, SOLUTION INTRAVENOUS CONTINUOUS
Status: ACTIVE | OUTPATIENT
Start: 2021-11-22 | End: 2021-11-23

## 2021-11-22 RX ORDER — OXYCODONE HYDROCHLORIDE 5 MG/1
5 TABLET ORAL EVERY 6 HOURS PRN
Status: DISCONTINUED | OUTPATIENT
Start: 2021-11-22 | End: 2021-11-25

## 2021-11-22 RX ORDER — PROCHLORPERAZINE EDISYLATE 5 MG/ML
5 INJECTION INTRAMUSCULAR; INTRAVENOUS EVERY 6 HOURS PRN
Status: DISCONTINUED | OUTPATIENT
Start: 2021-11-22 | End: 2021-11-25 | Stop reason: HOSPADM

## 2021-11-22 RX ORDER — IBUPROFEN 400 MG/1
400 TABLET ORAL ONCE
Status: COMPLETED | OUTPATIENT
Start: 2021-11-22 | End: 2021-11-22

## 2021-11-22 RX ORDER — HYDROCODONE BITARTRATE AND ACETAMINOPHEN 500; 5 MG/1; MG/1
TABLET ORAL
Status: DISCONTINUED | OUTPATIENT
Start: 2021-11-22 | End: 2021-11-24

## 2021-11-22 RX ORDER — FLUOXETINE HYDROCHLORIDE 20 MG/1
20 CAPSULE ORAL DAILY
Status: DISCONTINUED | OUTPATIENT
Start: 2021-11-22 | End: 2021-11-24

## 2021-11-22 RX ORDER — SODIUM CHLORIDE 0.9 % (FLUSH) 0.9 %
10 SYRINGE (ML) INJECTION
Status: DISCONTINUED | OUTPATIENT
Start: 2021-11-22 | End: 2021-11-25 | Stop reason: HOSPADM

## 2021-11-22 RX ADMIN — SODIUM CHLORIDE: 0.9 INJECTION, SOLUTION INTRAVENOUS at 04:11

## 2021-11-22 RX ADMIN — PROCHLORPERAZINE EDISYLATE 5 MG: 5 INJECTION INTRAMUSCULAR; INTRAVENOUS at 08:11

## 2021-11-22 RX ADMIN — CEFTRIAXONE 2 G: 2 INJECTION, SOLUTION INTRAVENOUS at 01:11

## 2021-11-22 RX ADMIN — IBUPROFEN 400 MG: 400 TABLET, FILM COATED ORAL at 08:11

## 2021-11-22 RX ADMIN — PIPERACILLIN SODIUM AND TAZOBACTAM SODIUM 4.5 G: 4; .5 INJECTION, POWDER, FOR SOLUTION INTRAVENOUS at 08:11

## 2021-11-22 RX ADMIN — ACETAMINOPHEN 1000 MG: 500 TABLET ORAL at 01:11

## 2021-11-22 RX ADMIN — HYDROMORPHONE HYDROCHLORIDE 1 MG: 1 INJECTION, SOLUTION INTRAMUSCULAR; INTRAVENOUS; SUBCUTANEOUS at 04:11

## 2021-11-22 RX ADMIN — SODIUM CHLORIDE, SODIUM LACTATE, POTASSIUM CHLORIDE, AND CALCIUM CHLORIDE 1497 ML: .6; .31; .03; .02 INJECTION, SOLUTION INTRAVENOUS at 12:11

## 2021-11-22 RX ADMIN — ACETAMINOPHEN 650 MG: 160 SOLUTION ORAL at 04:11

## 2021-11-23 LAB
ANION GAP SERPL CALC-SCNC: 10 MMOL/L (ref 8–16)
ANISOCYTOSIS BLD QL SMEAR: ABNORMAL
BASOPHILS # BLD AUTO: 0.02 K/UL (ref 0–0.2)
BASOPHILS NFR BLD: 0.2 % (ref 0–1.9)
BLD PROD TYP BPU: NORMAL
BLD PROD TYP BPU: NORMAL
BLOOD UNIT EXPIRATION DATE: NORMAL
BLOOD UNIT EXPIRATION DATE: NORMAL
BLOOD UNIT TYPE CODE: 6200
BLOOD UNIT TYPE CODE: 6200
BLOOD UNIT TYPE: NORMAL
BLOOD UNIT TYPE: NORMAL
BUN SERPL-MCNC: 9 MG/DL (ref 6–20)
BURR CELLS BLD QL SMEAR: ABNORMAL
CALCIUM SERPL-MCNC: 8.6 MG/DL (ref 8.7–10.5)
CHLORIDE SERPL-SCNC: 106 MMOL/L (ref 95–110)
CO2 SERPL-SCNC: 22 MMOL/L (ref 23–29)
CODING SYSTEM: NORMAL
CODING SYSTEM: NORMAL
CREAT SERPL-MCNC: 0.6 MG/DL (ref 0.5–1.4)
DACRYOCYTES BLD QL SMEAR: ABNORMAL
DIFFERENTIAL METHOD: ABNORMAL
DISPENSE STATUS: NORMAL
DISPENSE STATUS: NORMAL
EOSINOPHIL # BLD AUTO: 0 K/UL (ref 0–0.5)
EOSINOPHIL NFR BLD: 0.1 % (ref 0–8)
ERYTHROCYTE [DISTWIDTH] IN BLOOD BY AUTOMATED COUNT: 32.4 % (ref 11.5–14.5)
EST. GFR  (AFRICAN AMERICAN): >60 ML/MIN/1.73 M^2
EST. GFR  (NON AFRICAN AMERICAN): >60 ML/MIN/1.73 M^2
FACT VIII ACT/NOR PPP: 200 % (ref 60–170)
GLUCOSE SERPL-MCNC: 101 MG/DL (ref 70–110)
HCT VFR BLD AUTO: 31.8 % (ref 37–48.5)
HGB BLD-MCNC: 9.2 G/DL (ref 12–16)
HYPOCHROMIA BLD QL SMEAR: ABNORMAL
IMM GRANULOCYTES # BLD AUTO: 0.14 K/UL (ref 0–0.04)
IMM GRANULOCYTES NFR BLD AUTO: 1.1 % (ref 0–0.5)
LYMPHOCYTES # BLD AUTO: 1.1 K/UL (ref 1–4.8)
LYMPHOCYTES NFR BLD: 8.3 % (ref 18–48)
MCH RBC QN AUTO: 19.2 PG (ref 27–31)
MCHC RBC AUTO-ENTMCNC: 28.9 G/DL (ref 32–36)
MCV RBC AUTO: 66 FL (ref 82–98)
MONOCYTES # BLD AUTO: 0.5 K/UL (ref 0.3–1)
MONOCYTES NFR BLD: 4 % (ref 4–15)
NEUTROPHILS # BLD AUTO: 11.4 K/UL (ref 1.8–7.7)
NEUTROPHILS NFR BLD: 86.3 % (ref 38–73)
NRBC BLD-RTO: 0 /100 WBC
OVALOCYTES BLD QL SMEAR: ABNORMAL
PLATELET # BLD AUTO: 269 K/UL (ref 150–450)
PLATELET BLD QL SMEAR: ABNORMAL
PMV BLD AUTO: ABNORMAL FL (ref 9.2–12.9)
POIKILOCYTOSIS BLD QL SMEAR: ABNORMAL
POLYCHROMASIA BLD QL SMEAR: ABNORMAL
POTASSIUM SERPL-SCNC: 3 MMOL/L (ref 3.5–5.1)
RBC # BLD AUTO: 4.79 M/UL (ref 4–5.4)
SCHISTOCYTES BLD QL SMEAR: ABNORMAL
SCHISTOCYTES BLD QL SMEAR: PRESENT
SODIUM SERPL-SCNC: 138 MMOL/L (ref 136–145)
SPHEROCYTES BLD QL SMEAR: ABNORMAL
TARGETS BLD QL SMEAR: ABNORMAL
TRANS ERYTHROCYTES VOL PATIENT: NORMAL ML
TRANS ERYTHROCYTES VOL PATIENT: NORMAL ML
WBC # BLD AUTO: 13.21 K/UL (ref 3.9–12.7)

## 2021-11-23 PROCEDURE — 85025 COMPLETE CBC W/AUTO DIFF WBC: CPT | Performed by: HOSPITALIST

## 2021-11-23 PROCEDURE — 25000003 PHARM REV CODE 250: Performed by: HOSPITALIST

## 2021-11-23 PROCEDURE — P9021 RED BLOOD CELLS UNIT: HCPCS | Performed by: STUDENT IN AN ORGANIZED HEALTH CARE EDUCATION/TRAINING PROGRAM

## 2021-11-23 PROCEDURE — 25000003 PHARM REV CODE 250: Performed by: STUDENT IN AN ORGANIZED HEALTH CARE EDUCATION/TRAINING PROGRAM

## 2021-11-23 PROCEDURE — 99900035 HC TECH TIME PER 15 MIN (STAT)

## 2021-11-23 PROCEDURE — 94761 N-INVAS EAR/PLS OXIMETRY MLT: CPT

## 2021-11-23 PROCEDURE — 80048 BASIC METABOLIC PNL TOTAL CA: CPT | Performed by: HOSPITALIST

## 2021-11-23 PROCEDURE — 99222 PR INITIAL HOSPITAL CARE,LEVL II: ICD-10-PCS | Mod: ,,, | Performed by: HOSPITALIST

## 2021-11-23 PROCEDURE — 63600175 PHARM REV CODE 636 W HCPCS: Mod: JG | Performed by: HOSPITALIST

## 2021-11-23 PROCEDURE — 20600001 HC STEP DOWN PRIVATE ROOM

## 2021-11-23 PROCEDURE — 99222 1ST HOSP IP/OBS MODERATE 55: CPT | Mod: ,,, | Performed by: HOSPITALIST

## 2021-11-23 RX ORDER — IRON POLYSACCHARIDE COMPLEX 150 MG
150 CAPSULE ORAL DAILY
Status: DISCONTINUED | OUTPATIENT
Start: 2021-11-23 | End: 2021-11-25 | Stop reason: HOSPADM

## 2021-11-23 RX ORDER — DOCUSATE SODIUM 100 MG/1
100 CAPSULE, LIQUID FILLED ORAL DAILY
Status: DISCONTINUED | OUTPATIENT
Start: 2021-11-23 | End: 2021-11-24

## 2021-11-23 RX ADMIN — VANCOMYCIN HYDROCHLORIDE 750 MG: 750 INJECTION, POWDER, LYOPHILIZED, FOR SOLUTION INTRAVENOUS at 05:11

## 2021-11-23 RX ADMIN — Medication 150 MG: at 08:11

## 2021-11-23 RX ADMIN — CONJUGATED ESTROGENS 25 MG: 25 INJECTION, POWDER, LYOPHILIZED, FOR SOLUTION INTRAMUSCULAR; INTRAVENOUS at 03:11

## 2021-11-23 RX ADMIN — ACETAMINOPHEN 650 MG: 160 SOLUTION ORAL at 11:11

## 2021-11-23 RX ADMIN — ACETAMINOPHEN 650 MG: 160 SOLUTION ORAL at 05:11

## 2021-11-23 RX ADMIN — PIPERACILLIN SODIUM AND TAZOBACTAM SODIUM 4.5 G: 4; .5 INJECTION, POWDER, FOR SOLUTION INTRAVENOUS at 08:11

## 2021-11-23 RX ADMIN — PIPERACILLIN SODIUM AND TAZOBACTAM SODIUM 4.5 G: 4; .5 INJECTION, POWDER, FOR SOLUTION INTRAVENOUS at 04:11

## 2021-11-23 RX ADMIN — VANCOMYCIN HYDROCHLORIDE 500 MG: 500 INJECTION, POWDER, LYOPHILIZED, FOR SOLUTION INTRAVENOUS at 05:11

## 2021-11-23 RX ADMIN — CONJUGATED ESTROGENS 25 MG: 25 INJECTION, POWDER, LYOPHILIZED, FOR SOLUTION INTRAMUSCULAR; INTRAVENOUS at 01:11

## 2021-11-23 RX ADMIN — PIPERACILLIN SODIUM AND TAZOBACTAM SODIUM 4.5 G: 4; .5 INJECTION, POWDER, FOR SOLUTION INTRAVENOUS at 01:11

## 2021-11-23 RX ADMIN — CONJUGATED ESTROGENS 25 MG: 25 INJECTION, POWDER, LYOPHILIZED, FOR SOLUTION INTRAMUSCULAR; INTRAVENOUS at 06:11

## 2021-11-24 LAB
ANION GAP SERPL CALC-SCNC: 9 MMOL/L (ref 8–16)
ANISOCYTOSIS BLD QL SMEAR: ABNORMAL
BASOPHILS # BLD AUTO: 0.03 K/UL (ref 0–0.2)
BASOPHILS NFR BLD: 0.3 % (ref 0–1.9)
BUN SERPL-MCNC: 8 MG/DL (ref 6–20)
BURR CELLS BLD QL SMEAR: ABNORMAL
C DIFF GDH STL QL: NEGATIVE
C DIFF TOX A+B STL QL IA: NEGATIVE
CALCIUM SERPL-MCNC: 7.9 MG/DL (ref 8.7–10.5)
CHLORIDE SERPL-SCNC: 105 MMOL/L (ref 95–110)
CO2 SERPL-SCNC: 20 MMOL/L (ref 23–29)
CREAT SERPL-MCNC: 0.7 MG/DL (ref 0.5–1.4)
DACRYOCYTES BLD QL SMEAR: ABNORMAL
DIFFERENTIAL METHOD: ABNORMAL
EOSINOPHIL # BLD AUTO: 0 K/UL (ref 0–0.5)
EOSINOPHIL NFR BLD: 0.1 % (ref 0–8)
ERYTHROCYTE [DISTWIDTH] IN BLOOD BY AUTOMATED COUNT: 31.8 % (ref 11.5–14.5)
EST. GFR  (AFRICAN AMERICAN): >60 ML/MIN/1.73 M^2
EST. GFR  (NON AFRICAN AMERICAN): >60 ML/MIN/1.73 M^2
GLUCOSE SERPL-MCNC: 90 MG/DL (ref 70–110)
HCT VFR BLD AUTO: 24.9 % (ref 37–48.5)
HGB BLD-MCNC: 7.4 G/DL (ref 12–16)
HYPOCHROMIA BLD QL SMEAR: ABNORMAL
IMM GRANULOCYTES # BLD AUTO: 0.08 K/UL (ref 0–0.04)
IMM GRANULOCYTES NFR BLD AUTO: 0.8 % (ref 0–0.5)
LYMPHOCYTES # BLD AUTO: 1.3 K/UL (ref 1–4.8)
LYMPHOCYTES NFR BLD: 13.2 % (ref 18–48)
MCH RBC QN AUTO: 19.4 PG (ref 27–31)
MCHC RBC AUTO-ENTMCNC: 29.7 G/DL (ref 32–36)
MCV RBC AUTO: 65 FL (ref 82–98)
MONOCYTES # BLD AUTO: 0.8 K/UL (ref 0.3–1)
MONOCYTES NFR BLD: 8.1 % (ref 4–15)
NEUTROPHILS # BLD AUTO: 7.7 K/UL (ref 1.8–7.7)
NEUTROPHILS NFR BLD: 77.5 % (ref 38–73)
NRBC BLD-RTO: 0 /100 WBC
OVALOCYTES BLD QL SMEAR: ABNORMAL
PLATELET # BLD AUTO: 302 K/UL (ref 150–450)
PLATELET BLD QL SMEAR: ABNORMAL
PMV BLD AUTO: ABNORMAL FL (ref 9.2–12.9)
POIKILOCYTOSIS BLD QL SMEAR: ABNORMAL
POLYCHROMASIA BLD QL SMEAR: ABNORMAL
POTASSIUM SERPL-SCNC: 3.4 MMOL/L (ref 3.5–5.1)
RBC # BLD AUTO: 3.82 M/UL (ref 4–5.4)
SCHISTOCYTES BLD QL SMEAR: ABNORMAL
SCHISTOCYTES BLD QL SMEAR: PRESENT
SODIUM SERPL-SCNC: 134 MMOL/L (ref 136–145)
SPHEROCYTES BLD QL SMEAR: ABNORMAL
TARGETS BLD QL SMEAR: ABNORMAL
WBC # BLD AUTO: 9.88 K/UL (ref 3.9–12.7)

## 2021-11-24 PROCEDURE — 25000003 PHARM REV CODE 250: Performed by: STUDENT IN AN ORGANIZED HEALTH CARE EDUCATION/TRAINING PROGRAM

## 2021-11-24 PROCEDURE — 27000207 HC ISOLATION

## 2021-11-24 PROCEDURE — 87449 NOS EACH ORGANISM AG IA: CPT | Performed by: HOSPITALIST

## 2021-11-24 PROCEDURE — 20600001 HC STEP DOWN PRIVATE ROOM

## 2021-11-24 PROCEDURE — 99232 PR SUBSEQUENT HOSPITAL CARE,LEVL II: ICD-10-PCS | Mod: ,,, | Performed by: HOSPITALIST

## 2021-11-24 PROCEDURE — 36415 COLL VENOUS BLD VENIPUNCTURE: CPT | Performed by: HOSPITALIST

## 2021-11-24 PROCEDURE — 63600175 PHARM REV CODE 636 W HCPCS: Performed by: HOSPITALIST

## 2021-11-24 PROCEDURE — 25000003 PHARM REV CODE 250: Performed by: HOSPITALIST

## 2021-11-24 PROCEDURE — 80048 BASIC METABOLIC PNL TOTAL CA: CPT | Performed by: HOSPITALIST

## 2021-11-24 PROCEDURE — 94761 N-INVAS EAR/PLS OXIMETRY MLT: CPT

## 2021-11-24 PROCEDURE — 87324 CLOSTRIDIUM AG IA: CPT | Performed by: HOSPITALIST

## 2021-11-24 PROCEDURE — 85025 COMPLETE CBC W/AUTO DIFF WBC: CPT | Performed by: HOSPITALIST

## 2021-11-24 PROCEDURE — 82272 OCCULT BLD FECES 1-3 TESTS: CPT | Performed by: HOSPITALIST

## 2021-11-24 PROCEDURE — 99232 SBSQ HOSP IP/OBS MODERATE 35: CPT | Mod: ,,, | Performed by: HOSPITALIST

## 2021-11-24 RX ORDER — LOPERAMIDE HYDROCHLORIDE 2 MG/1
2 CAPSULE ORAL 4 TIMES DAILY PRN
Status: DISCONTINUED | OUTPATIENT
Start: 2021-11-24 | End: 2021-11-25 | Stop reason: HOSPADM

## 2021-11-24 RX ADMIN — Medication 150 MG: at 08:11

## 2021-11-24 RX ADMIN — ACETAMINOPHEN 650 MG: 160 SOLUTION ORAL at 01:11

## 2021-11-24 RX ADMIN — PIPERACILLIN SODIUM AND TAZOBACTAM SODIUM 4.5 G: 4; .5 INJECTION, POWDER, FOR SOLUTION INTRAVENOUS at 08:11

## 2021-11-24 RX ADMIN — VANCOMYCIN HYDROCHLORIDE 750 MG: 750 INJECTION, POWDER, LYOPHILIZED, FOR SOLUTION INTRAVENOUS at 04:11

## 2021-11-24 RX ADMIN — PIPERACILLIN SODIUM AND TAZOBACTAM SODIUM 4.5 G: 4; .5 INJECTION, POWDER, FOR SOLUTION INTRAVENOUS at 12:11

## 2021-11-24 RX ADMIN — ACETAMINOPHEN 650 MG: 160 SOLUTION ORAL at 06:11

## 2021-11-24 RX ADMIN — PIPERACILLIN SODIUM AND TAZOBACTAM SODIUM 4.5 G: 4; .5 INJECTION, POWDER, FOR SOLUTION INTRAVENOUS at 04:11

## 2021-11-25 VITALS
BODY MASS INDEX: 17.68 KG/M2 | OXYGEN SATURATION: 96 % | DIASTOLIC BLOOD PRESSURE: 58 MMHG | RESPIRATION RATE: 18 BRPM | HEIGHT: 66 IN | SYSTOLIC BLOOD PRESSURE: 99 MMHG | WEIGHT: 110 LBS | HEART RATE: 46 BPM | TEMPERATURE: 98 F

## 2021-11-25 PROBLEM — D50.0 IRON DEFICIENCY ANEMIA DUE TO CHRONIC BLOOD LOSS: Status: ACTIVE | Noted: 2021-11-22

## 2021-11-25 PROBLEM — D64.9 ANEMIA REQUIRING TRANSFUSIONS: Status: ACTIVE | Noted: 2021-11-25

## 2021-11-25 LAB
ANION GAP SERPL CALC-SCNC: 10 MMOL/L (ref 8–16)
ANISOCYTOSIS BLD QL SMEAR: ABNORMAL
BACTERIA UR CULT: ABNORMAL
BASOPHILS # BLD AUTO: 0.04 K/UL (ref 0–0.2)
BASOPHILS NFR BLD: 0.6 % (ref 0–1.9)
BUN SERPL-MCNC: 8 MG/DL (ref 6–20)
CALCIUM SERPL-MCNC: 7.8 MG/DL (ref 8.7–10.5)
CHLORIDE SERPL-SCNC: 106 MMOL/L (ref 95–110)
CO2 SERPL-SCNC: 20 MMOL/L (ref 23–29)
CREAT SERPL-MCNC: 0.6 MG/DL (ref 0.5–1.4)
DIFFERENTIAL METHOD: ABNORMAL
EOSINOPHIL # BLD AUTO: 0 K/UL (ref 0–0.5)
EOSINOPHIL NFR BLD: 0.4 % (ref 0–8)
ERYTHROCYTE [DISTWIDTH] IN BLOOD BY AUTOMATED COUNT: 33.1 % (ref 11.5–14.5)
EST. GFR  (AFRICAN AMERICAN): >60 ML/MIN/1.73 M^2
EST. GFR  (NON AFRICAN AMERICAN): >60 ML/MIN/1.73 M^2
GIANT PLATELETS BLD QL SMEAR: PRESENT
GLUCOSE SERPL-MCNC: 72 MG/DL (ref 70–110)
HCT VFR BLD AUTO: 24.9 % (ref 37–48.5)
HGB BLD-MCNC: 7.4 G/DL (ref 12–16)
HYPOCHROMIA BLD QL SMEAR: ABNORMAL
IMM GRANULOCYTES # BLD AUTO: 0.09 K/UL (ref 0–0.04)
IMM GRANULOCYTES NFR BLD AUTO: 1.3 % (ref 0–0.5)
LYMPHOCYTES # BLD AUTO: 1.4 K/UL (ref 1–4.8)
LYMPHOCYTES NFR BLD: 19.4 % (ref 18–48)
MCH RBC QN AUTO: 19.2 PG (ref 27–31)
MCHC RBC AUTO-ENTMCNC: 29.7 G/DL (ref 32–36)
MCV RBC AUTO: 65 FL (ref 82–98)
MONOCYTES # BLD AUTO: 0.8 K/UL (ref 0.3–1)
MONOCYTES NFR BLD: 10.6 % (ref 4–15)
NEUTROPHILS # BLD AUTO: 4.8 K/UL (ref 1.8–7.7)
NEUTROPHILS NFR BLD: 67.7 % (ref 38–73)
NRBC BLD-RTO: 0 /100 WBC
OVALOCYTES BLD QL SMEAR: ABNORMAL
PLATELET # BLD AUTO: 356 K/UL (ref 150–450)
PLATELET BLD QL SMEAR: ABNORMAL
PMV BLD AUTO: ABNORMAL FL (ref 9.2–12.9)
POIKILOCYTOSIS BLD QL SMEAR: ABNORMAL
POLYCHROMASIA BLD QL SMEAR: ABNORMAL
POTASSIUM SERPL-SCNC: 3.6 MMOL/L (ref 3.5–5.1)
RBC # BLD AUTO: 3.86 M/UL (ref 4–5.4)
SCHISTOCYTES BLD QL SMEAR: ABNORMAL
SODIUM SERPL-SCNC: 136 MMOL/L (ref 136–145)
SPHEROCYTES BLD QL SMEAR: ABNORMAL
TARGETS BLD QL SMEAR: ABNORMAL
WBC # BLD AUTO: 7.08 K/UL (ref 3.9–12.7)

## 2021-11-25 PROCEDURE — 25000003 PHARM REV CODE 250: Performed by: STUDENT IN AN ORGANIZED HEALTH CARE EDUCATION/TRAINING PROGRAM

## 2021-11-25 PROCEDURE — 85025 COMPLETE CBC W/AUTO DIFF WBC: CPT | Performed by: HOSPITALIST

## 2021-11-25 PROCEDURE — 63600175 PHARM REV CODE 636 W HCPCS: Performed by: HOSPITALIST

## 2021-11-25 PROCEDURE — 25000003 PHARM REV CODE 250: Performed by: HOSPITALIST

## 2021-11-25 PROCEDURE — 93010 EKG 12-LEAD: ICD-10-PCS | Mod: ,,, | Performed by: INTERNAL MEDICINE

## 2021-11-25 PROCEDURE — 80048 BASIC METABOLIC PNL TOTAL CA: CPT | Performed by: HOSPITALIST

## 2021-11-25 PROCEDURE — 99239 HOSP IP/OBS DSCHRG MGMT >30: CPT | Mod: ,,, | Performed by: HOSPITALIST

## 2021-11-25 PROCEDURE — 36415 COLL VENOUS BLD VENIPUNCTURE: CPT | Performed by: HOSPITALIST

## 2021-11-25 PROCEDURE — 99239 PR HOSPITAL DISCHARGE DAY,>30 MIN: ICD-10-PCS | Mod: ,,, | Performed by: HOSPITALIST

## 2021-11-25 PROCEDURE — 93005 ELECTROCARDIOGRAM TRACING: CPT

## 2021-11-25 PROCEDURE — 93010 ELECTROCARDIOGRAM REPORT: CPT | Mod: ,,, | Performed by: INTERNAL MEDICINE

## 2021-11-25 RX ORDER — CIPROFLOXACIN 500 MG/1
500 TABLET ORAL EVERY 12 HOURS
Qty: 8 TABLET | Refills: 0 | Status: SHIPPED | OUTPATIENT
Start: 2021-11-26 | End: 2021-11-30

## 2021-11-25 RX ORDER — HYDROCODONE BITARTRATE AND ACETAMINOPHEN 5; 325 MG/1; MG/1
1 TABLET ORAL EVERY 8 HOURS PRN
Status: DISCONTINUED | OUTPATIENT
Start: 2021-11-25 | End: 2021-11-25 | Stop reason: HOSPADM

## 2021-11-25 RX ORDER — FERROUS SULFATE 300 MG/5ML
300 LIQUID (ML) ORAL 2 TIMES DAILY
Qty: 300 ML | Refills: 2 | Status: SHIPPED | OUTPATIENT
Start: 2021-11-25 | End: 2021-12-25

## 2021-11-25 RX ORDER — ACETAMINOPHEN 650 MG/20.3ML
650 LIQUID ORAL EVERY 6 HOURS PRN
Status: DISCONTINUED | OUTPATIENT
Start: 2021-11-25 | End: 2021-11-25 | Stop reason: HOSPADM

## 2021-11-25 RX ORDER — CEFTRIAXONE 1 G/1
1 INJECTION, POWDER, FOR SOLUTION INTRAMUSCULAR; INTRAVENOUS ONCE
Status: COMPLETED | OUTPATIENT
Start: 2021-11-25 | End: 2021-11-25

## 2021-11-25 RX ADMIN — Medication 150 MG: at 08:11

## 2021-11-25 RX ADMIN — CEFTRIAXONE SODIUM 1 G: 1 INJECTION, POWDER, FOR SOLUTION INTRAMUSCULAR; INTRAVENOUS at 03:11

## 2021-11-25 RX ADMIN — PIPERACILLIN SODIUM AND TAZOBACTAM SODIUM 4.5 G: 4; .5 INJECTION, POWDER, FOR SOLUTION INTRAVENOUS at 05:11

## 2021-11-25 RX ADMIN — PIPERACILLIN SODIUM AND TAZOBACTAM SODIUM 4.5 G: 4; .5 INJECTION, POWDER, FOR SOLUTION INTRAVENOUS at 12:11

## 2021-11-26 LAB
VWF AG ACT/NOR PPP IA: 297 % (ref 55–200)
VWF:AC ACT/NOR PPP IA: 266 % (ref 55–200)

## 2021-11-27 LAB
BACTERIA BLD CULT: NORMAL
BACTERIA BLD CULT: NORMAL

## 2021-11-29 DIAGNOSIS — D64.9 ANEMIA, UNSPECIFIED TYPE: Primary | ICD-10-CM

## 2021-11-30 ENCOUNTER — NURSE TRIAGE (OUTPATIENT)
Dept: ADMINISTRATIVE | Facility: CLINIC | Age: 31
End: 2021-11-30

## 2021-11-30 LAB — OB PNL STL: POSITIVE

## 2023-10-25 ENCOUNTER — OFFICE VISIT (OUTPATIENT)
Dept: URGENT CARE | Facility: CLINIC | Age: 33
End: 2023-10-25
Payer: COMMERCIAL

## 2023-10-25 VITALS
TEMPERATURE: 100 F | HEART RATE: 89 BPM | WEIGHT: 110 LBS | DIASTOLIC BLOOD PRESSURE: 69 MMHG | SYSTOLIC BLOOD PRESSURE: 107 MMHG | RESPIRATION RATE: 16 BRPM | BODY MASS INDEX: 17.68 KG/M2 | HEIGHT: 66 IN

## 2023-10-25 DIAGNOSIS — R35.0 FREQUENCY OF URINATION: Primary | ICD-10-CM

## 2023-10-25 DIAGNOSIS — N12 PYELONEPHRITIS: ICD-10-CM

## 2023-10-25 LAB
BILIRUB UR QL STRIP: NEGATIVE
GLUCOSE UR QL STRIP: NEGATIVE
KETONES UR QL STRIP: NEGATIVE
LEUKOCYTE ESTERASE UR QL STRIP: POSITIVE
PH, POC UA: 7 (ref 5–8)
POC BLOOD, URINE: NEGATIVE
POC NITRATES, URINE: POSITIVE
PROT UR QL STRIP: POSITIVE
SP GR UR STRIP: 1.01 (ref 1–1.03)
UROBILINOGEN UR STRIP-ACNC: ABNORMAL (ref 0.1–1.1)

## 2023-10-25 PROCEDURE — 81003 URINALYSIS AUTO W/O SCOPE: CPT | Mod: QW,S$GLB,,

## 2023-10-25 PROCEDURE — 87086 URINE CULTURE/COLONY COUNT: CPT

## 2023-10-25 PROCEDURE — 87088 URINE BACTERIA CULTURE: CPT

## 2023-10-25 PROCEDURE — 87186 SC STD MICRODIL/AGAR DIL: CPT

## 2023-10-25 PROCEDURE — 81003 POCT URINALYSIS, DIPSTICK, AUTOMATED, W/O SCOPE: ICD-10-PCS | Mod: QW,S$GLB,,

## 2023-10-25 PROCEDURE — 99203 PR OFFICE/OUTPT VISIT, NEW, LEVL III, 30-44 MIN: ICD-10-PCS | Mod: S$GLB,,,

## 2023-10-25 PROCEDURE — 99203 OFFICE O/P NEW LOW 30 MIN: CPT | Mod: S$GLB,,,

## 2023-10-25 PROCEDURE — 87077 CULTURE AEROBIC IDENTIFY: CPT

## 2023-10-25 RX ORDER — FLUOXETINE HYDROCHLORIDE 40 MG/1
40 CAPSULE ORAL EVERY MORNING
COMMUNITY
Start: 2023-10-10

## 2023-10-25 RX ORDER — CIPROFLOXACIN 500 MG/1
500 TABLET ORAL EVERY 12 HOURS
Qty: 14 TABLET | Refills: 0 | Status: SHIPPED | OUTPATIENT
Start: 2023-10-25 | End: 2023-11-01

## 2023-10-25 RX ORDER — FLUOXETINE HYDROCHLORIDE 20 MG/1
20 CAPSULE ORAL EVERY MORNING
COMMUNITY
Start: 2023-10-10

## 2023-10-25 NOTE — PROGRESS NOTES
Pt is reporting for dysuria onset yesterday. She is also experiencing body aches, chills. Denies any fever, nasal congestion, sore throat, ear pain, SOB, CP, N/V/D. She has a history of pyelonephritis and was hospitalized 2 years ago. Pt states she feels the same, but not as bad. Denies any blood in her urine or vaginal discharge.

## 2023-10-25 NOTE — PATIENT INSTRUCTIONS
Take Ciprofloxacin twice daily.  Take Tylenol or Ibuprofen as needed for any pain or fever.   Please follow up in 2 days if symptoms do not improve.  Please return here or go to the Emergency Department for any concerns or worsening of condition.  If you were prescribed antibiotics, please take them to completion.  If you were prescribed a narcotic medication, do not drive or operate heavy equipment or machinery while taking these medications.  Please follow up with your primary care doctor or specialist as needed.  Please drink plenty of fluids.  Please get plenty of rest.  If you were prescribed Pyridium (phenazopyridine), please be aware that if you wear contact lens that this medication may stain your contacts.  While taking this medication it is recommended that you do not wear your contacts until 24 hours after your last dose.  Please follow up with your primary care doctor or specialist as needed.    If you  smoke, please stop smoking.

## 2023-10-25 NOTE — PROGRESS NOTES
"Subjective:      Patient ID: Georgia Lares is a 32 y.o. female.    Vitals:  height is 5' 6" (1.676 m) and weight is 49.9 kg (110 lb). Her oral temperature is 100 °F (37.8 °C). Her blood pressure is 107/69 and her pulse is 89. Her respiration is 16.     Chief Complaint: Fever (Pt is coming in today w/ fever, frequent urination, ibuprofen, lower back, chills and body aches that all started this morning. )    Pt is presenting for dysuria onset yesterday. Dysuria is accompanied by chills and body aches. Pt states "I feel like I have been hit by a bus". She has a history of Pyelonephritis and feels similarly, but not as bad. Denies any nasal congestion, CP, SOB, hematuria, vaginal discharge.     Fever   Pertinent negatives include no chest pain, coughing, diarrhea, ear pain, nausea, rash, sore throat, urinary pain or vomiting.       Constitution: Positive for chills, fatigue and fever. Negative for activity change and appetite change.   HENT:  Negative for ear pain, ear discharge, postnasal drip, sinus pain, sinus pressure and sore throat.    Neck: Negative for neck pain.   Cardiovascular:  Negative for chest pain and sob on exertion.   Eyes:  Negative for foreign body in eye, eye discharge and eye redness.   Respiratory:  Negative for cough and shortness of breath.    Gastrointestinal:  Negative for nausea, vomiting, constipation and diarrhea.   Genitourinary:  Negative for dysuria, frequency, urgency and urine decreased.   Musculoskeletal:  Positive for back pain and muscle ache. Negative for pain and abnormal ROM of joint.   Skin:  Negative for color change and rash.   Neurological:  Negative for altered mental status.   Psychiatric/Behavioral:  Negative for altered mental status and confusion.       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)       Family History "   Problem Relation Age of Onset    Multiple sclerosis Mother     Multiple sclerosis Paternal Aunt        Social History     Socioeconomic History    Marital status: Single   Tobacco Use    Smoking status: Never    Smokeless tobacco: Never   Substance and Sexual Activity    Alcohol use: Yes     Alcohol/week: 7.0 standard drinks of alcohol     Types: 7 Shots of liquor per week    Drug use: No    Sexual activity: Yes     Partners: Male       Current Outpatient Medications   Medication Sig Dispense Refill    FLUoxetine 20 MG capsule Take 20 mg by mouth every morning.      FLUoxetine 40 MG capsule Take 40 mg by mouth every morning.      ciprofloxacin HCl (CIPRO) 500 MG tablet Take 1 tablet (500 mg total) by mouth every 12 (twelve) hours. for 7 days 14 tablet 0     No current facility-administered medications for this visit.       Review of patient's allergies indicates:  No Known Allergies   Objective:     Physical Exam   Constitutional: She is oriented to person, place, and time. She appears well-developed. She is cooperative.  Non-toxic appearance. She does not appear ill. No distress.   HENT:   Head: Normocephalic and atraumatic.   Ears:   Right Ear: Hearing, tympanic membrane, external ear and ear canal normal.   Left Ear: Hearing, tympanic membrane, external ear and ear canal normal.   Nose: Nose normal. No mucosal edema, rhinorrhea, nasal deformity or congestion. No epistaxis. Right sinus exhibits no maxillary sinus tenderness and no frontal sinus tenderness. Left sinus exhibits no maxillary sinus tenderness and no frontal sinus tenderness.   Mouth/Throat: Uvula is midline, oropharynx is clear and moist and mucous membranes are normal. No trismus in the jaw. Normal dentition. No uvula swelling. No oropharyngeal exudate, posterior oropharyngeal edema or posterior oropharyngeal erythema.   Eyes: Conjunctivae and lids are normal. No scleral icterus.   Neck: Trachea normal and phonation normal. Neck supple.  No edema present. No erythema present. No neck rigidity present.   Cardiovascular: Normal rate, regular rhythm, normal heart sounds and normal pulses.   Pulmonary/Chest: Effort normal and breath sounds normal. No respiratory distress. She has no decreased breath sounds. She has no rhonchi.   Abdominal: Normal appearance and bowel sounds are normal. She exhibits no distension. Soft. There is no abdominal tenderness. There is right CVA tenderness.   Musculoskeletal: Normal range of motion.         General: No deformity. Normal range of motion.   Neurological: She is alert and oriented to person, place, and time. She exhibits normal muscle tone. Coordination normal.   Skin: Skin is warm, dry, intact, not diaphoretic and not pale.   Psychiatric: Her speech is normal and behavior is normal. Judgment and thought content normal.   Nursing note and vitals reviewed.    Results for orders placed or performed in visit on 10/25/23   POCT Urinalysis, Dipstick, Automated, W/O Scope   Result Value Ref Range    POC Blood, Urine Negative Negative    POC Bilirubin, Urine Negative Negative    POC Urobilinogen, Urine Norm 0.1 - 1.1    POC Ketones, Urine Negative Negative    POC Protein, Urine Positive (A) Negative    POC Nitrates, Urine Positive (A) Negative    POC Glucose, Urine Negative Negative    pH, UA 7.0 5 - 8    POC Specific Gravity, Urine 1.015 1.003 - 1.029    POC Leukocytes, Urine Positive (A) Negative      Assessment:     1. Frequency of urination    2. Pyelonephritis        Plan:   I have reviewed the patient chart and pertinent past imaging/labs.     Frequency of urination  -     POCT Urinalysis, Dipstick, Automated, W/O Scope  -     CULTURE, URINE    Pyelonephritis  -     ciprofloxacin HCl (CIPRO) 500 MG tablet; Take 1 tablet (500 mg total) by mouth every 12 (twelve) hours. for 7 days  Dispense: 14 tablet; Refill: 0      Patient Instructions   Take Ciprofloxacin twice daily.  Take Tylenol or Ibuprofen as needed for any  pain or fever.   Please follow up in 2 days if symptoms do not improve.  Please return here or go to the Emergency Department for any concerns or worsening of condition.  If you were prescribed antibiotics, please take them to completion.  If you were prescribed a narcotic medication, do not drive or operate heavy equipment or machinery while taking these medications.  Please follow up with your primary care doctor or specialist as needed.  Please drink plenty of fluids.  Please get plenty of rest.  If you were prescribed Pyridium (phenazopyridine), please be aware that if you wear contact lens that this medication may stain your contacts.  While taking this medication it is recommended that you do not wear your contacts until 24 hours after your last dose.  Please follow up with your primary care doctor or specialist as needed.    If you  smoke, please stop smoking.

## 2023-10-27 LAB — BACTERIA UR CULT: ABNORMAL

## 2023-10-28 ENCOUNTER — TELEPHONE (OUTPATIENT)
Dept: URGENT CARE | Facility: CLINIC | Age: 33
End: 2023-10-28
Payer: COMMERCIAL

## 2024-11-01 ENCOUNTER — HOSPITAL ENCOUNTER (EMERGENCY)
Facility: HOSPITAL | Age: 34
Discharge: PSYCHIATRIC OR SUBSTANCE USE FACILITY | End: 2024-11-02
Attending: STUDENT IN AN ORGANIZED HEALTH CARE EDUCATION/TRAINING PROGRAM
Payer: COMMERCIAL

## 2024-11-01 DIAGNOSIS — R45.851 SUICIDAL IDEATION: Primary | ICD-10-CM

## 2024-11-01 DIAGNOSIS — D64.9 ACUTE ON CHRONIC ANEMIA: ICD-10-CM

## 2024-11-01 DIAGNOSIS — D50.0 IRON DEFICIENCY ANEMIA DUE TO CHRONIC BLOOD LOSS: ICD-10-CM

## 2024-11-01 DIAGNOSIS — F32.A DEPRESSION, UNSPECIFIED DEPRESSION TYPE: ICD-10-CM

## 2024-11-01 LAB
ABO + RH BLD: NORMAL
ALBUMIN SERPL BCP-MCNC: 4.7 G/DL (ref 3.5–5.2)
ALP SERPL-CCNC: 58 U/L (ref 40–150)
ALT SERPL W/O P-5'-P-CCNC: 9 U/L (ref 10–44)
ANION GAP SERPL CALC-SCNC: 11 MMOL/L (ref 8–16)
APAP SERPL-MCNC: <3 UG/ML (ref 10–20)
AST SERPL-CCNC: 16 U/L (ref 10–40)
B-HCG UR QL: NEGATIVE
BASOPHILS # BLD AUTO: 0.05 K/UL (ref 0–0.2)
BASOPHILS NFR BLD: 0.6 % (ref 0–1.9)
BILIRUB SERPL-MCNC: 0.4 MG/DL (ref 0.1–1)
BILIRUB UR QL STRIP: NEGATIVE
BLD GP AB SCN CELLS X3 SERPL QL: NORMAL
BLD PROD TYP BPU: NORMAL
BLOOD UNIT EXPIRATION DATE: NORMAL
BLOOD UNIT TYPE CODE: 6200
BLOOD UNIT TYPE: NORMAL
BUN SERPL-MCNC: 6 MG/DL (ref 6–20)
CALCIUM SERPL-MCNC: 10 MG/DL (ref 8.7–10.5)
CHLORIDE SERPL-SCNC: 107 MMOL/L (ref 95–110)
CLARITY UR REFRACT.AUTO: CLEAR
CO2 SERPL-SCNC: 20 MMOL/L (ref 23–29)
CODING SYSTEM: NORMAL
COLOR UR AUTO: COLORLESS
CREAT SERPL-MCNC: 0.7 MG/DL (ref 0.5–1.4)
CROSSMATCH INTERPRETATION: NORMAL
CTP QC/QA: YES
DIFFERENTIAL METHOD BLD: ABNORMAL
DISPENSE STATUS: NORMAL
EOSINOPHIL # BLD AUTO: 0 K/UL (ref 0–0.5)
EOSINOPHIL NFR BLD: 0.4 % (ref 0–8)
ERYTHROCYTE [DISTWIDTH] IN BLOOD BY AUTOMATED COUNT: 22.4 % (ref 11.5–14.5)
EST. GFR  (NO RACE VARIABLE): >60 ML/MIN/1.73 M^2
ETHANOL SERPL-MCNC: <10 MG/DL
GLUCOSE SERPL-MCNC: 87 MG/DL (ref 70–110)
GLUCOSE UR QL STRIP: NEGATIVE
HCT VFR BLD AUTO: 24.6 % (ref 37–48.5)
HCV AB SERPL QL IA: NORMAL
HGB BLD-MCNC: 6.3 G/DL (ref 12–16)
HGB UR QL STRIP: NEGATIVE
HIV 1+2 AB+HIV1 P24 AG SERPL QL IA: NORMAL
IMM GRANULOCYTES # BLD AUTO: 0.1 K/UL (ref 0–0.04)
IMM GRANULOCYTES NFR BLD AUTO: 1.3 % (ref 0–0.5)
KETONES UR QL STRIP: ABNORMAL
LEUKOCYTE ESTERASE UR QL STRIP: NEGATIVE
LYMPHOCYTES # BLD AUTO: 1.7 K/UL (ref 1–4.8)
LYMPHOCYTES NFR BLD: 21.5 % (ref 18–48)
MCH RBC QN AUTO: 14.9 PG (ref 27–31)
MCHC RBC AUTO-ENTMCNC: 25.6 G/DL (ref 32–36)
MCV RBC AUTO: 58 FL (ref 82–98)
MONOCYTES # BLD AUTO: 0.5 K/UL (ref 0.3–1)
MONOCYTES NFR BLD: 5.8 % (ref 4–15)
NEUTROPHILS # BLD AUTO: 5.6 K/UL (ref 1.8–7.7)
NEUTROPHILS NFR BLD: 70.4 % (ref 38–73)
NITRITE UR QL STRIP: NEGATIVE
NRBC BLD-RTO: 0 /100 WBC
PH UR STRIP: 6 [PH] (ref 5–8)
PLATELET # BLD AUTO: 223 K/UL (ref 150–450)
PMV BLD AUTO: ABNORMAL FL (ref 9.2–12.9)
POTASSIUM SERPL-SCNC: 4.3 MMOL/L (ref 3.5–5.1)
PROT SERPL-MCNC: 8 G/DL (ref 6–8.4)
PROT UR QL STRIP: NEGATIVE
RBC # BLD AUTO: 4.22 M/UL (ref 4–5.4)
SODIUM SERPL-SCNC: 138 MMOL/L (ref 136–145)
SP GR UR STRIP: 1.01 (ref 1–1.03)
SPECIMEN OUTDATE: NORMAL
T4 FREE SERPL-MCNC: 0.94 NG/DL (ref 0.71–1.51)
TRANS ERYTHROCYTES VOL PATIENT: NORMAL ML
TSH SERPL DL<=0.005 MIU/L-ACNC: 4.1 UIU/ML (ref 0.4–4)
URN SPEC COLLECT METH UR: ABNORMAL
WBC # BLD AUTO: 7.89 K/UL (ref 3.9–12.7)

## 2024-11-01 PROCEDURE — 86901 BLOOD TYPING SEROLOGIC RH(D): CPT | Performed by: STUDENT IN AN ORGANIZED HEALTH CARE EDUCATION/TRAINING PROGRAM

## 2024-11-01 PROCEDURE — 80053 COMPREHEN METABOLIC PANEL: CPT | Performed by: EMERGENCY MEDICINE

## 2024-11-01 PROCEDURE — 81025 URINE PREGNANCY TEST: CPT | Performed by: STUDENT IN AN ORGANIZED HEALTH CARE EDUCATION/TRAINING PROGRAM

## 2024-11-01 PROCEDURE — 86920 COMPATIBILITY TEST SPIN: CPT | Performed by: STUDENT IN AN ORGANIZED HEALTH CARE EDUCATION/TRAINING PROGRAM

## 2024-11-01 PROCEDURE — 84443 ASSAY THYROID STIM HORMONE: CPT | Performed by: EMERGENCY MEDICINE

## 2024-11-01 PROCEDURE — P9021 RED BLOOD CELLS UNIT: HCPCS | Performed by: STUDENT IN AN ORGANIZED HEALTH CARE EDUCATION/TRAINING PROGRAM

## 2024-11-01 PROCEDURE — 25000003 PHARM REV CODE 250: Performed by: STUDENT IN AN ORGANIZED HEALTH CARE EDUCATION/TRAINING PROGRAM

## 2024-11-01 PROCEDURE — 80307 DRUG TEST PRSMV CHEM ANLYZR: CPT | Performed by: EMERGENCY MEDICINE

## 2024-11-01 PROCEDURE — 99285 EMERGENCY DEPT VISIT HI MDM: CPT | Mod: 25

## 2024-11-01 PROCEDURE — 85025 COMPLETE CBC W/AUTO DIFF WBC: CPT | Performed by: EMERGENCY MEDICINE

## 2024-11-01 PROCEDURE — 80143 DRUG ASSAY ACETAMINOPHEN: CPT | Performed by: EMERGENCY MEDICINE

## 2024-11-01 PROCEDURE — 87389 HIV-1 AG W/HIV-1&-2 AB AG IA: CPT | Performed by: PHYSICIAN ASSISTANT

## 2024-11-01 PROCEDURE — 82077 ASSAY SPEC XCP UR&BREATH IA: CPT | Performed by: EMERGENCY MEDICINE

## 2024-11-01 PROCEDURE — 86803 HEPATITIS C AB TEST: CPT | Performed by: PHYSICIAN ASSISTANT

## 2024-11-01 PROCEDURE — 81003 URINALYSIS AUTO W/O SCOPE: CPT | Performed by: EMERGENCY MEDICINE

## 2024-11-01 PROCEDURE — 84439 ASSAY OF FREE THYROXINE: CPT | Performed by: EMERGENCY MEDICINE

## 2024-11-01 PROCEDURE — 36430 TRANSFUSION BLD/BLD COMPNT: CPT

## 2024-11-01 RX ORDER — ONDANSETRON 4 MG/1
4 TABLET, ORALLY DISINTEGRATING ORAL
Status: COMPLETED | OUTPATIENT
Start: 2024-11-01 | End: 2024-11-01

## 2024-11-01 RX ORDER — LORAZEPAM 1 MG/1
1 TABLET ORAL
Status: COMPLETED | OUTPATIENT
Start: 2024-11-01 | End: 2024-11-01

## 2024-11-01 RX ORDER — HYDROCODONE BITARTRATE AND ACETAMINOPHEN 500; 5 MG/1; MG/1
TABLET ORAL
Status: DISCONTINUED | OUTPATIENT
Start: 2024-11-01 | End: 2024-11-02 | Stop reason: HOSPADM

## 2024-11-01 RX ADMIN — ONDANSETRON 4 MG: 4 TABLET, ORALLY DISINTEGRATING ORAL at 07:11

## 2024-11-01 RX ADMIN — LORAZEPAM 1 MG: 1 TABLET ORAL at 07:11

## 2024-11-01 NOTE — ED PROVIDER NOTES
Chief Complaint   Suicidal (Sent from  therapist office)      History Of Present Illness   Georgia Lares is a 33 y.o. female with a PMHx including depression  presenting with SI.  Patient states that for the past 2 months she has been having worsening depressive symptoms.  States that she stopped taking her Wellbutrin and Prozac about 2 months ago.  Since then has been having worsening depressive thoughts, fatigue, and apathy.  States she was had additional stressors in life including a recent break up, family disagreements, and work issues.  States that she saw her therapist today who advised her to come to the ED for concern for her safety.  Patient states that she lives alone and there were no firearms in the house.  She reports passive SI but denies any active plan.  Otherwise reports no HI, or hallucinations.  No medications taken prior to arrival.    Independent Historian: Yes  Other Historian or Collateral: Chart review  Interpretor: No      Review of patient's allergies indicates:  No Known Allergies    No current facility-administered medications on file prior to encounter.     Current Outpatient Medications on File Prior to Encounter   Medication Sig Dispense Refill    FLUoxetine 20 MG capsule Take 20 mg by mouth every morning.      FLUoxetine 40 MG capsule Take 40 mg by mouth every morning.         Past History   As per HPI and below:  Past Medical History:   Diagnosis Date    Anemia      Past Surgical History:   Procedure Laterality Date    ORIF CONGENITAL HIP DISLOCATION      L-side congenital hip dysplasia (has pin)       Social History     Socioeconomic History    Marital status: Single   Tobacco Use    Smoking status: Never    Smokeless tobacco: Never   Substance and Sexual Activity    Alcohol use: Yes     Alcohol/week: 7.0 standard drinks of alcohol     Types: 7 Shots of liquor per week    Drug use: No    Sexual activity: Yes     Partners: Male       Family History   Problem Relation Name Age  "of Onset    Multiple sclerosis Mother      Multiple sclerosis Paternal Aunt         Physical Exam     Vitals:    11/01/24 1654   BP: 131/86   Pulse: (!) 112   Resp: 18   Temp: 98.4 °F (36.9 °C)   TempSrc: Oral   SpO2: 99%   Weight: 45.4 kg (100 lb)   Height: 5' 6" (1.676 m)       Physical Exam  Vitals reviewed.   Constitutional:       General: She is not in acute distress.     Appearance: Normal appearance. She is not toxic-appearing.   HENT:      Head: Normocephalic and atraumatic.      Nose: No congestion.      Mouth/Throat:      Mouth: Mucous membranes are moist.   Eyes:      General: No scleral icterus.     Extraocular Movements: Extraocular movements intact.   Cardiovascular:      Rate and Rhythm: Normal rate and regular rhythm.   Pulmonary:      Effort: No respiratory distress.      Breath sounds: No wheezing or rhonchi.   Abdominal:      General: There is no distension.      Palpations: Abdomen is soft.      Tenderness: There is no abdominal tenderness. There is no guarding.   Musculoskeletal:         General: No swelling or deformity.      Cervical back: No rigidity.   Skin:     General: Skin is warm and dry.      Capillary Refill: Capillary refill takes less than 2 seconds.   Neurological:      General: No focal deficit present.      Mental Status: She is alert and oriented to person, place, and time.   Psychiatric:         Attention and Perception: She does not perceive auditory or visual hallucinations.         Mood and Affect: Mood is depressed. Affect is tearful.         Behavior: Behavior is cooperative.         Thought Content: Thought content does not include homicidal ideation. Thought content does not include suicidal plan.             Results     Labs Reviewed   CBC W/ AUTO DIFFERENTIAL - Abnormal       Result Value    WBC 7.89      RBC 4.22      Hemoglobin 6.3 (*)     Hematocrit 24.6 (*)     MCV 58 (*)     MCH 14.9 (*)     MCHC 25.6 (*)     RDW 22.4 (*)     Platelets 223      MPV SEE COMMENT      " Immature Granulocytes 1.3 (*)     Gran # (ANC) 5.6      Immature Grans (Abs) 0.10 (*)     Lymph # 1.7      Mono # 0.5      Eos # 0.0      Baso # 0.05      nRBC 0      Gran % 70.4      Lymph % 21.5      Mono % 5.8      Eosinophil % 0.4      Basophil % 0.6      Differential Method Automated      Narrative:     Release to patient->Immediate   COMPREHENSIVE METABOLIC PANEL - Abnormal    Sodium 138      Potassium 4.3      Chloride 107      CO2 20 (*)     Glucose 87      BUN 6      Creatinine 0.7      Calcium 10.0      Total Protein 8.0      Albumin 4.7      Total Bilirubin 0.4      Alkaline Phosphatase 58      AST 16      ALT 9 (*)     eGFR >60.0      Anion Gap 11      Narrative:     Release to patient->Immediate   TSH - Abnormal    TSH 4.097 (*)     Narrative:     Release to patient->Immediate   URINALYSIS, REFLEX TO URINE CULTURE - Abnormal    Specimen UA Urine, Clean Catch      Color, UA Colorless (*)     Appearance, UA Clear      pH, UA 6.0      Specific Gravity, UA 1.010      Protein, UA Negative      Glucose, UA Negative      Ketones, UA 1+ (*)     Bilirubin (UA) Negative      Occult Blood UA Negative      Nitrite, UA Negative      Leukocytes, UA Negative      Narrative:     Specimen Source->Urine   ACETAMINOPHEN LEVEL - Abnormal    Acetaminophen (Tylenol), Serum <3.0 (*)     Narrative:     Release to patient->Immediate   HIV 1 / 2 ANTIBODY    HIV 1/2 Ag/Ab Non-reactive      Narrative:     Release to patient->Immediate   HEPATITIS C ANTIBODY    Hepatitis C Ab Non-reactive      Narrative:     Release to patient->Immediate   ALCOHOL,MEDICAL (ETHANOL)    Alcohol, Serum <10      Narrative:     Release to patient->Immediate   T4, FREE    Free T4 0.94      Narrative:     Release to patient->Immediate   DRUG SCREEN PANEL, URINE EMERGENCY   POCT URINE PREGNANCY    POC Preg Test, Ur Negative       Acceptable Yes     TYPE & SCREEN   PREPARE RBC SOFT       Imaging Results    None           Initial MDM   Medical  Decision Making  Patient is a 33-year-old female presenting with depression and passive SI.  Advised to come to the ED by her therapist due to concern for the patient's safety at home.  Due to concern for the patient's safety, pec placed.  Discussed pec with the patient and likely psychiatric transfer for further evaluation.  Will get labs to further evaluate for other contributing factors such as electrolyte abnormality, anemia, kidney injury, or thyroid dysfunction.    Amount and/or Complexity of Data Reviewed  Labs: ordered.    Risk  Prescription drug management.                  Medications Given / Interventions     Medications   0.9%  NaCl infusion (for blood administration) (has no administration in time range)   LORazepam tablet 1 mg (1 mg Oral Given 11/1/24 1947)   ondansetron disintegrating tablet 4 mg (4 mg Oral Given 11/1/24 1947)       Procedures     ED POCUS Performed: No    Reassessment and ED Course     ED Course as of 11/01/24 2114 Fri Nov 01, 2024 1958 TSH(!): 4.097  T4 pending.  [CH]   1958 Acetaminophen Level(!): <3.0 [CH]   1958 Alcohol, Serum: <10 [CH]   1958 hCG Qualitative, Urine: Negative [CH]   1958 Hemoglobin(!): 6.3  Patient has chronic anemia and last hemoglobin from 2 years ago was 7.4.  No reported active bleeding at this time. [CH]   2007 Anemia consistent with microcytic anemia and patient has a reported history of chronic iron-deficiency anemia secondary to heavy menses. [CH]   2007 Will transfuse 1 unit but anticipate to still transfer for further psychiatric care [CH]   2028 UA noninfectious. [CH]   2051 Free T4: 0.94 [CH]      ED Course User Index  [CH] Stella Saul MD     Upon my evaluation, this patient had a high probability of imminent or life-threatening deterioration due to acute on chronic anemia, which required my direct attention, intervention, and personal management.    I have personally provided 30 minutes of critical care time exclusive of time spent on  separately billable procedures. Time includes review of laboratory data, radiology results, discussion with consultants, and monitoring for potential decompensation. Interventions were performed as documented.       Medically cleared for psychiatry placement: 11/1/2024  9:10 PM    Final diagnoses:  [R45.851] Suicidal ideation (Primary)  [F32.A] Depression, unspecified depression type  [D64.9] Acute on chronic anemia  [D50.0] Iron deficiency anemia due to chronic blood loss       Patient underwent a work up in the emergency department. No acute organic cause of the patient's current psychiatric illness has been identified at this time. Patient medically cleared for psychiatric evaluation.          Dispo      ED Disposition Condition    Transfer to Psych Facility Stable            ED Prescriptions    None       Follow-up Information    None                     Stella Saul MD  11/01/24 5313

## 2024-11-02 ENCOUNTER — HOSPITAL ENCOUNTER (INPATIENT)
Facility: HOSPITAL | Age: 34
LOS: 4 days | Discharge: HOME | DRG: 885 | End: 2024-11-06
Attending: STUDENT IN AN ORGANIZED HEALTH CARE EDUCATION/TRAINING PROGRAM | Admitting: STUDENT IN AN ORGANIZED HEALTH CARE EDUCATION/TRAINING PROGRAM
Payer: COMMERCIAL

## 2024-11-02 VITALS
SYSTOLIC BLOOD PRESSURE: 115 MMHG | TEMPERATURE: 98 F | BODY MASS INDEX: 16.07 KG/M2 | DIASTOLIC BLOOD PRESSURE: 67 MMHG | HEIGHT: 66 IN | WEIGHT: 100 LBS | HEART RATE: 101 BPM | RESPIRATION RATE: 18 BRPM | OXYGEN SATURATION: 100 %

## 2024-11-02 DIAGNOSIS — F32.A DEPRESSION: Primary | ICD-10-CM

## 2024-11-02 LAB
AMPHET+METHAMPHET UR QL: NEGATIVE
BARBITURATES UR QL SCN>200 NG/ML: NEGATIVE
BASOPHILS # BLD AUTO: 0.05 K/UL (ref 0–0.2)
BASOPHILS NFR BLD: 0.6 % (ref 0–1.9)
BENZODIAZ UR QL SCN>200 NG/ML: NEGATIVE
BZE UR QL SCN: NEGATIVE
CANNABINOIDS UR QL SCN: ABNORMAL
CREAT UR-MCNC: 53 MG/DL (ref 15–325)
DIFFERENTIAL METHOD BLD: ABNORMAL
EOSINOPHIL # BLD AUTO: 0 K/UL (ref 0–0.5)
EOSINOPHIL NFR BLD: 0.2 % (ref 0–8)
ERYTHROCYTE [DISTWIDTH] IN BLOOD BY AUTOMATED COUNT: 26.9 % (ref 11.5–14.5)
ETHANOL UR-MCNC: <10 MG/DL
FERRITIN SERPL-MCNC: 5 NG/ML (ref 20–300)
FOLATE SERPL-MCNC: 13.3 NG/ML (ref 4–24)
HCT VFR BLD AUTO: 25.5 % (ref 37–48.5)
HGB BLD-MCNC: 7 G/DL (ref 12–16)
IMM GRANULOCYTES # BLD AUTO: 0.04 K/UL (ref 0–0.04)
IMM GRANULOCYTES NFR BLD AUTO: 0.5 % (ref 0–0.5)
IRON SATN MFR SERPL: 71 % (ref 20–50)
IRON SERPL-MCNC: 374 UG/DL (ref 30–160)
LYMPHOCYTES # BLD AUTO: 0.9 K/UL (ref 1–4.8)
LYMPHOCYTES NFR BLD: 10.7 % (ref 18–48)
MCH RBC QN AUTO: 16.9 PG (ref 27–31)
MCHC RBC AUTO-ENTMCNC: 27.5 G/DL (ref 32–36)
MCV RBC AUTO: 62 FL (ref 82–98)
METHADONE UR QL SCN>300 NG/ML: NEGATIVE
MONOCYTES # BLD AUTO: 0.4 K/UL (ref 0.3–1)
MONOCYTES NFR BLD: 4.6 % (ref 4–15)
NEUTROPHILS # BLD AUTO: 7.1 K/UL (ref 1.8–7.7)
NEUTROPHILS NFR BLD: 83.4 % (ref 38–73)
NRBC BLD-RTO: 0 /100 WBC
OPIATES UR QL SCN: NEGATIVE
PCP UR QL SCN>25 NG/ML: NEGATIVE
PLATELET # BLD AUTO: 185 K/UL (ref 150–450)
PMV BLD AUTO: ABNORMAL FL (ref 9.2–12.9)
RBC # BLD AUTO: 4.14 M/UL (ref 4–5.4)
TOTAL IRON BINDING CAPACITY: 529 UG/DL (ref 250–450)
TOXICOLOGY INFORMATION: ABNORMAL
VIT B12 SERPL-MCNC: 581 PG/ML (ref 210–950)
WBC # BLD AUTO: 8.51 K/UL (ref 3.9–12.7)

## 2024-11-02 PROCEDURE — 82746 ASSAY OF FOLIC ACID SERUM: CPT | Performed by: INTERNAL MEDICINE

## 2024-11-02 PROCEDURE — 82607 VITAMIN B-12: CPT | Performed by: INTERNAL MEDICINE

## 2024-11-02 PROCEDURE — 85025 COMPLETE CBC W/AUTO DIFF WBC: CPT | Performed by: INTERNAL MEDICINE

## 2024-11-02 PROCEDURE — 82728 ASSAY OF FERRITIN: CPT | Performed by: INTERNAL MEDICINE

## 2024-11-02 PROCEDURE — 25000003 PHARM REV CODE 250: Performed by: PSYCHIATRY & NEUROLOGY

## 2024-11-02 PROCEDURE — 90833 PSYTX W PT W E/M 30 MIN: CPT | Mod: ,,, | Performed by: PSYCHIATRY & NEUROLOGY

## 2024-11-02 PROCEDURE — 36415 COLL VENOUS BLD VENIPUNCTURE: CPT | Performed by: INTERNAL MEDICINE

## 2024-11-02 PROCEDURE — 83550 IRON BINDING TEST: CPT | Performed by: INTERNAL MEDICINE

## 2024-11-02 PROCEDURE — 11400000 HC PSYCH PRIVATE ROOM

## 2024-11-02 PROCEDURE — 99233 SBSQ HOSP IP/OBS HIGH 50: CPT | Mod: ,,, | Performed by: PSYCHIATRY & NEUROLOGY

## 2024-11-02 RX ORDER — IBUPROFEN 200 MG
1 TABLET ORAL DAILY PRN
Status: DISCONTINUED | OUTPATIENT
Start: 2024-11-02 | End: 2024-11-06 | Stop reason: HOSPADM

## 2024-11-02 RX ORDER — BENZONATATE 100 MG/1
100 CAPSULE ORAL 3 TIMES DAILY PRN
Status: DISCONTINUED | OUTPATIENT
Start: 2024-11-02 | End: 2024-11-06 | Stop reason: HOSPADM

## 2024-11-02 RX ORDER — HYDROXYZINE PAMOATE 50 MG/1
50 CAPSULE ORAL EVERY 6 HOURS PRN
Status: DISCONTINUED | OUTPATIENT
Start: 2024-11-02 | End: 2024-11-06 | Stop reason: HOSPADM

## 2024-11-02 RX ORDER — FLUOXETINE HYDROCHLORIDE 20 MG/1
20 CAPSULE ORAL EVERY MORNING
Status: DISCONTINUED | OUTPATIENT
Start: 2024-11-02 | End: 2024-11-04

## 2024-11-02 RX ORDER — ACETAMINOPHEN 325 MG/1
650 TABLET ORAL EVERY 6 HOURS PRN
Status: DISCONTINUED | OUTPATIENT
Start: 2024-11-02 | End: 2024-11-06 | Stop reason: HOSPADM

## 2024-11-02 RX ORDER — BUPROPION HYDROCHLORIDE 150 MG/1
150 TABLET ORAL DAILY
Status: DISCONTINUED | OUTPATIENT
Start: 2024-11-02 | End: 2024-11-06 | Stop reason: HOSPADM

## 2024-11-02 RX ORDER — BENZTROPINE MESYLATE 1 MG/ML
2 INJECTION, SOLUTION INTRAMUSCULAR; INTRAVENOUS EVERY 8 HOURS PRN
Status: DISCONTINUED | OUTPATIENT
Start: 2024-11-02 | End: 2024-11-06 | Stop reason: HOSPADM

## 2024-11-02 RX ORDER — OLANZAPINE 10 MG/2ML
10 INJECTION, POWDER, FOR SOLUTION INTRAMUSCULAR EVERY 8 HOURS PRN
Status: DISCONTINUED | OUTPATIENT
Start: 2024-11-02 | End: 2024-11-06 | Stop reason: HOSPADM

## 2024-11-02 RX ORDER — ONDANSETRON 4 MG/1
4 TABLET, ORALLY DISINTEGRATING ORAL EVERY 8 HOURS PRN
Status: DISCONTINUED | OUTPATIENT
Start: 2024-11-02 | End: 2024-11-06 | Stop reason: HOSPADM

## 2024-11-02 RX ORDER — OLANZAPINE 10 MG/1
10 TABLET ORAL EVERY 8 HOURS PRN
Status: DISCONTINUED | OUTPATIENT
Start: 2024-11-02 | End: 2024-11-06 | Stop reason: HOSPADM

## 2024-11-02 RX ORDER — PROMETHAZINE HYDROCHLORIDE 25 MG/1
25 TABLET ORAL EVERY 6 HOURS PRN
Status: DISCONTINUED | OUTPATIENT
Start: 2024-11-02 | End: 2024-11-06 | Stop reason: HOSPADM

## 2024-11-02 RX ORDER — LOPERAMIDE HYDROCHLORIDE 2 MG/1
2 CAPSULE ORAL
Status: DISCONTINUED | OUTPATIENT
Start: 2024-11-02 | End: 2024-11-06 | Stop reason: HOSPADM

## 2024-11-02 RX ORDER — ALUMINUM HYDROXIDE, MAGNESIUM HYDROXIDE, AND SIMETHICONE 1200; 120; 1200 MG/30ML; MG/30ML; MG/30ML
30 SUSPENSION ORAL EVERY 6 HOURS PRN
Status: DISCONTINUED | OUTPATIENT
Start: 2024-11-02 | End: 2024-11-06 | Stop reason: HOSPADM

## 2024-11-02 RX ADMIN — FLUOXETINE HYDROCHLORIDE 20 MG: 20 CAPSULE ORAL at 10:11

## 2024-11-02 RX ADMIN — BUPROPION HYDROCHLORIDE 150 MG: 150 TABLET, EXTENDED RELEASE ORAL at 10:11

## 2024-11-02 NOTE — NURSING
As per ER chart:   33 y.o. female with a PMHx including depression  presenting with SI.  Patient states that for the past 2 months she has been having worsening depressive symptoms.  States that she stopped taking her Wellbutrin and Prozac about 2 months ago.  Since then has been having worsening depressive thoughts, fatigue, and apathy.  States she was had additional stressors in life including a recent break up, family disagreements, and work issues.  States that she saw her therapist today who advised her to come to the ED for concern for her safety.  Patient states that she lives alone and there were no firearms in the house.  She reports passive SI but denies any active plan.  Otherwise reports no HI, or hallucinations.  No medications taken prior to arrival.   Pt arrived to floor per [facility] with [employer] security x 2 at pt side. Pt checked per proscan machine with negative findings prior to walking on to the unit.  Pt's belongings given to mht to inventory.  Pt continues to feel depressed, anxious. See full assessment per admission profile. Pt denying si, hi or a v hallucinations.  Pt gravely disabled.  Pt does however, c/o feeling stressed.  Pt oriented to unit and unit routine.   Admission paperwork explained to pt and signed.  See in chart.  Pt instructed to call staff for any needs or concerns at all. Verbalized understanding.  Will cont to monitor for safety. Patient care ongoing.  Q15 min close obs maintained as per psychiatrist's orders.

## 2024-11-02 NOTE — PLAN OF CARE
Problem: Adult Behavioral Health Plan of Care  Goal: Plan of Care Review  Outcome: Progressing  Goal: Patient-Specific Goal (Individualization)  Outcome: Progressing  Goal: Adheres to Safety Considerations for Self and Others  Outcome: Progressing  Goal: Absence of New-Onset Illness or Injury  Outcome: Progressing  Goal: Optimized Coping Skills in Response to Life Stressors  Outcome: Progressing  Goal: Develops/Participates in Therapeutic [place] to Support Successful Transition  Outcome: Progressing  Goal: Rounds/Family Conference  Outcome: Progressing

## 2024-11-02 NOTE — ED NOTES
PT and sister requesting that the sister take home the patient's belongings. Nurse asked patient if she is comfortable with the sister taking her belongings home. Pt stated she is okay with her sister taking her things. Care on going.

## 2024-11-02 NOTE — HPI
Patient is a 33-year-old female admitted to our inpatient psych unit.  Patient has no medical complaints.  Medical history reviewed and noted.  Patient does have a history of iron-deficiency anemia and heavy menses.  She was transfused 1 unit of red blood cells secondary to hemoglobin of 6.3.  The patient and I have discussed oral iron therapy which she is attempted and not tolerated in the past.  We have discussed IV iron treatment.  We will repeat CBC.

## 2024-11-02 NOTE — PROVIDER PROGRESS NOTES - EMERGENCY DEPT.
Created in error.  Patient is medically cleared.  Pending blood transfusion for a transfer to psychiatric facility.

## 2024-11-02 NOTE — ED NOTES
Received report from [facility]. Assumed care of Georgia Lares, a 33 y.o. female at this time.    Triage note:  Chief Complaint   Patient presents with    Suicidal     Sent from  therapist office       LOC: The patient is awake, alert and aware of environment with an appropriate affect, oriented x 4 and speaking appropriately.   APPEARANCE: Patient appears comfortable and in no acute distress, patient is clean and well groomed.  NEURO: Pt opens eyes spontaneously, behavior appropriate to situation, follows commands, facial expression symmetrical, bilateral hand grasp equal and even, purposeful motor response noted, normal sensation in all extremities when touched with a finger, PERRLA noted.  HEENT: Head is normocephalic and sits midline on the shoulders, eyes are symmetrical with no blurry vision or glasses; ears are clean, dry, intact, patent with no cerumen blocking the pathway, no hearing aids present; nose is free from clean, dry, intact, without any drainage, both nostrils are patent.   SKIN: The skin is warm and dry, color consistent with ethnicity, patient has normal skin turgor and moist mucus membranes, skin intact, no breakdown or bruising noted. No drains or devices.  MUSCULOSKELETAL: Patient moving all extremities spontaneously with AROM in all extremities, no swelling noted. Generalized weakness and fatigue noted.  RESPIRATORY: Airway is open and patent, respirations are spontaneous, patient has a normal effort and rate, no accessory muscle use noted, breath sounds are clear and equal bilaterally. The pt denies SOB at this time.  CARDIAC: Cardiac monitoring continued. Patient has a normal rate and regular rhythm, no edema noted, capillary refill < 3 seconds. The pt denies chest pain at this time.  GASTRO: Soft and non tender to palpation, no distention noted, normoactive bowel sounds present in all four quadrants. The pt denies any nausea or vomiting at this time.  : Pt denies any pain or  frequency with urination.  VASCULAR: All pulses 2+, all extremities warm, no numbness or tingling to any extremities, no edema noted.

## 2024-11-02 NOTE — SUBJECTIVE & OBJECTIVE
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    FLUoxetine 40 MG capsule Take 40 mg by mouth every morning.     Family History       Problem Relation (Age of Onset)    Multiple sclerosis Mother, Paternal Aunt          Tobacco Use    Smoking status: Never    Smokeless tobacco: Never   Substance and Sexual Activity    Alcohol use: Yes     Alcohol/week: 7.0 standard drinks of alcohol     Types: 7 Shots of liquor per week    Drug use: No    Sexual activity: Yes     Partners: Male     Review of Systems   Constitutional:  Negative for activity change, appetite change, chills and fever.   HENT:  Negative for ear pain, mouth sores, nosebleeds and sore throat.    Eyes:  Negative for visual disturbance.   Respiratory:  Negative for cough, shortness of breath and wheezing.    Cardiovascular:  Negative for chest pain, palpitations and leg swelling.   Gastrointestinal:  Negative for abdominal distention, abdominal pain, blood in stool, constipation, diarrhea, nausea and vomiting.   Endocrine: Negative for polyphagia.   Genitourinary:  Negative for difficulty urinating, dysuria, flank pain and frequency.   Musculoskeletal:  Negative for arthralgias, back pain and myalgias.   Skin:  Negative for rash.   Neurological:  Negative for dizziness, tremors, seizures, syncope, facial asymmetry, speech difficulty, weakness and headaches.   Hematological:   Negative for adenopathy.     Objective:     Vital Signs (Most Recent):    Vital Signs (24h Range):  Temp:  [98.1 °F (36.7 °C)-98.6 °F (37 °C)] 98.2 °F (36.8 °C)  Pulse:  [] 101  Resp:  [13-20] 18  SpO2:  [99 %-100 %] 100 %  BP: ()/(54-86) 115/67        There is no height or weight on file to calculate BMI.     Physical Exam  Vitals and nursing note reviewed.   Constitutional:       General: She is awake. She is not in acute distress.     Appearance: Normal appearance. She is not ill-appearing, toxic-appearing or diaphoretic.   HENT:      Head: Normocephalic and atraumatic.      Nose: Nose normal. No congestion or rhinorrhea.      Mouth/Throat:      Mouth: Mucous membranes are moist.      Pharynx: Oropharynx is clear. No oropharyngeal exudate or posterior oropharyngeal erythema.   Eyes:      General: No scleral icterus.        Right eye: No discharge.         Left eye: No discharge.      Extraocular Movements: Extraocular movements intact.      Pupils: Pupils are equal, round, and reactive to light.   Neck:      Thyroid: No thyroid mass or thyromegaly.      Vascular: No carotid bruit.      Meningeal: Brudzinski's sign and Kernig's sign absent.   Cardiovascular:      Rate and Rhythm: Normal rate and regular rhythm.      Chest Wall: PMI is not displaced. No thrill.      Pulses: Normal pulses.      Heart sounds: Normal heart sounds. No murmur heard.     No friction rub. No gallop.   Pulmonary:      Effort: Pulmonary effort is normal. No tachypnea, accessory muscle usage, prolonged expiration or respiratory distress.      Breath sounds: Normal breath sounds. No stridor or decreased air movement. No wheezing, rhonchi or rales.   Chest:      Chest wall: No tenderness.   Abdominal:      General: Bowel sounds are normal. There is no distension.      Palpations: Abdomen is soft. There is no hepatomegaly, splenomegaly or mass.      Tenderness: There is no abdominal tenderness. There is no right CVA tenderness, left  CVA tenderness, guarding or rebound.      Hernia: No hernia is present.   Musculoskeletal:         General: No swelling, tenderness, deformity or signs of injury. Normal range of motion.      Cervical back: Neck supple. No rigidity. No muscular tenderness.      Right lower leg: No edema.      Left lower leg: No edema.   Lymphadenopathy:      Cervical: No cervical adenopathy.   Skin:     General: Skin is warm.      Capillary Refill: Capillary refill takes less than 2 seconds.      Coloration: Skin is not cyanotic, jaundiced or pale.      Findings: No bruising, erythema, lesion, petechiae or rash.   Neurological:      General: No focal deficit present.      Mental Status: She is alert and oriented to person, place, and time. Mental status is at baseline.      Cranial Nerves: No cranial nerve deficit, dysarthria or facial asymmetry.      Motor: No weakness or tremor.   Psychiatric:         Mood and Affect: Mood normal. Mood is not anxious or depressed. Affect is not flat.         Speech: Speech is not rapid and pressured or slurred.         Behavior: Behavior normal. Behavior is not agitated, aggressive or combative.         Thought Content: Thought content is not paranoid or delusional.         Cognition and Memory: Cognition is not impaired. Memory is not impaired.              CRANIAL NERVES     CN III, IV, VI   Pupils are equal, round, and reactive to light.       Significant Labs: All pertinent labs within the past 24 hours have been reviewed.    Significant Imaging: I have reviewed all pertinent imaging results/findings within the past 24 hours.

## 2024-11-02 NOTE — H&P
[facility]) Hospital Medicine  History & Physical    Patient Name: Georgia Lares  MRN: [id number]  Patient Class: IP- Psych  Admission Date: 11/2/2024  Attending Physician: Ottoniel Guzmán III, MD  Primary Care Provider: [facility]         Patient information was obtained from patient, past medical records, and ER records.     Subjective:     Principal Problem:<principal problem not specified>    Chief Complaint: No chief complaint on file.       HPI: Patient is a 33-year-old female admitted to our inpatient psych unit.  Patient has no medical complaints.  Medical history reviewed and noted.  Patient does have a history of iron-deficiency anemia and heavy menses.  She was transfused 1 unit of red blood cells secondary to hemoglobin of 6.3.  The patient and I have discussed oral iron therapy which she is attempted and not tolerated in the past.  We have discussed IV iron treatment.  We will repeat CBC.    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    FLUoxetine 40 MG capsule Take 40 mg by mouth every morning.     Family History       Problem Relation (Age of Onset)    Multiple sclerosis Mother, Paternal Aunt          Tobacco Use    Smoking status: Never    Smokeless tobacco: Never   Substance and Sexual Activity    Alcohol use: Yes     Alcohol/week: 7.0 standard drinks of alcohol     Types: 7  Shots of liquor per week    Drug use: No    Sexual activity: Yes     Partners: Male     Review of Systems   Constitutional:  Negative for activity change, appetite change, chills and fever.   HENT:  Negative for ear pain, mouth sores, nosebleeds and sore throat.    Eyes:  Negative for visual disturbance.   Respiratory:  Negative for cough, shortness of breath and wheezing.    Cardiovascular:  Negative for chest pain, palpitations and leg swelling.   Gastrointestinal:  Negative for abdominal distention, abdominal pain, blood in stool, constipation, diarrhea, nausea and vomiting.   Endocrine: Negative for polyphagia.   Genitourinary:  Negative for difficulty urinating, dysuria, flank pain and frequency.   Musculoskeletal:  Negative for arthralgias, back pain and myalgias.   Skin:  Negative for rash.   Neurological:  Negative for dizziness, tremors, seizures, syncope, facial asymmetry, speech difficulty, weakness and headaches.   Hematological:  Negative for adenopathy.     Objective:     Vital Signs (Most Recent):    Vital Signs (24h Range):  Temp:  [98.1 °F (36.7 °C)-98.6 °F (37 °C)] 98.2 °F (36.8 °C)  Pulse:  [] 101  Resp:  [13-20] 18  SpO2:  [99 %-100 %] 100 %  BP: ()/(54-86) 115/67        There is no height or weight on file to calculate BMI.     Physical Exam  Vitals and nursing note reviewed.   Constitutional:       General: She is awake. She is not in acute distress.     Appearance: Normal appearance. She is not ill-appearing, toxic-appearing or diaphoretic.   HENT:      Head: Normocephalic and atraumatic.      Nose: Nose normal. No congestion or rhinorrhea.      Mouth/Throat:      Mouth: Mucous membranes are moist.      Pharynx: Oropharynx is clear. No oropharyngeal exudate or posterior oropharyngeal erythema.   Eyes:      General: No scleral icterus.        Right eye: No discharge.         Left eye: No discharge.      Extraocular Movements: Extraocular movements intact.      Pupils: Pupils are  equal, round, and reactive to light.   Neck:      Thyroid: No thyroid mass or thyromegaly.      Vascular: No carotid bruit.      Meningeal: Brudzinski's sign and Kernig's sign absent.   Cardiovascular:      Rate and Rhythm: Normal rate and regular rhythm.      Chest Wall: PMI is not displaced. No thrill.      Pulses: Normal pulses.      Heart sounds: Normal heart sounds. No murmur heard.     No friction rub. No gallop.   Pulmonary:      Effort: Pulmonary effort is normal. No tachypnea, accessory muscle usage, prolonged expiration or respiratory distress.      Breath sounds: Normal breath sounds. No stridor or decreased air movement. No wheezing, rhonchi or rales.   Chest:      Chest wall: No tenderness.   Abdominal:      General: Bowel sounds are normal. There is no distension.      Palpations: Abdomen is soft. There is no hepatomegaly, splenomegaly or mass.      Tenderness: There is no abdominal tenderness. There is no right CVA tenderness, left CVA tenderness, guarding or rebound.      Hernia: No hernia is present.   Musculoskeletal:         General: No swelling, tenderness, deformity or signs of injury. Normal range of motion.      Cervical back: Neck supple. No rigidity. No muscular tenderness.      Right lower leg: No edema.      Left lower leg: No edema.   Lymphadenopathy:      Cervical: No cervical adenopathy.   Skin:     General: Skin is warm.      Capillary Refill: Capillary refill takes less than 2 seconds.      Coloration: Skin is not cyanotic, jaundiced or pale.      Findings: No bruising, erythema, lesion, petechiae or rash.   Neurological:      General: No focal deficit present.      Mental Status: She is alert and oriented to person, place, and time. Mental status is at baseline.      Cranial Nerves: No cranial nerve deficit, dysarthria or facial asymmetry.      Motor: No weakness or tremor.   Psychiatric:         Mood and Affect: Mood normal. Mood is not anxious or depressed. Affect is not flat.          Speech: Speech is not rapid and pressured or slurred.         Behavior: Behavior normal. Behavior is not agitated, aggressive or combative.         Thought Content: Thought content is not paranoid or delusional.         Cognition and Memory: Cognition is not impaired. Memory is not impaired.              CRANIAL NERVES     CN III, IV, VI   Pupils are equal, round, and reactive to light.       Significant Labs: All pertinent labs within the past 24 hours have been reviewed.    Significant Imaging: I have reviewed all pertinent imaging results/findings within the past 24 hours.  Assessment/Plan:     Anemia requiring transfusions  Check iron studies.  FU with PCP.    Recent Labs     11/01/24  1853   HGB 6.3*   HCT 24.6*         VTE Risk Mitigation (From admission, onward)      None                            Ottoniel Guzmán III, MD  Department of Hospital Medicine  [facility] ([facility])

## 2024-11-02 NOTE — ED TRIAGE NOTES
"Patient comes into the emergency department by POV with complaints of SI. Patient states that she has been having suicidal ideation and "just wants to feel like herself again." Pt states she doesn't have a plan but has just been feeling depressed and "not wanting to be here anymore." Patient denies hallucinations and hallucinations.  "
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
## 2024-11-03 LAB
BASOPHILS # BLD AUTO: 0.03 K/UL (ref 0–0.2)
BASOPHILS NFR BLD: 0.7 % (ref 0–1.9)
CHOLEST SERPL-MCNC: 110 MG/DL (ref 120–199)
CHOLEST/HDLC SERPL: 2 {RATIO} (ref 2–5)
DIFFERENTIAL METHOD BLD: ABNORMAL
EOSINOPHIL # BLD AUTO: 0.1 K/UL (ref 0–0.5)
EOSINOPHIL NFR BLD: 1.6 % (ref 0–8)
ERYTHROCYTE [DISTWIDTH] IN BLOOD BY AUTOMATED COUNT: 26.9 % (ref 11.5–14.5)
ESTIMATED AVG GLUCOSE: 103 MG/DL (ref 68–131)
HBA1C MFR BLD: 5.2 % (ref 4–5.6)
HCT VFR BLD AUTO: 25.7 % (ref 37–48.5)
HDLC SERPL-MCNC: 54 MG/DL (ref 40–75)
HDLC SERPL: 49.1 % (ref 20–50)
HGB BLD-MCNC: 7.1 G/DL (ref 12–16)
IMM GRANULOCYTES # BLD AUTO: 0.01 K/UL (ref 0–0.04)
IMM GRANULOCYTES NFR BLD AUTO: 0.2 % (ref 0–0.5)
LDLC SERPL CALC-MCNC: 44.2 MG/DL (ref 63–159)
LYMPHOCYTES # BLD AUTO: 0.9 K/UL (ref 1–4.8)
LYMPHOCYTES NFR BLD: 20.9 % (ref 18–48)
MCH RBC QN AUTO: 16.9 PG (ref 27–31)
MCHC RBC AUTO-ENTMCNC: 27.6 G/DL (ref 32–36)
MCV RBC AUTO: 61 FL (ref 82–98)
MONOCYTES # BLD AUTO: 0.5 K/UL (ref 0.3–1)
MONOCYTES NFR BLD: 10.7 % (ref 4–15)
NEUTROPHILS # BLD AUTO: 2.8 K/UL (ref 1.8–7.7)
NEUTROPHILS NFR BLD: 65.9 % (ref 38–73)
NONHDLC SERPL-MCNC: 56 MG/DL
NRBC BLD-RTO: 0 /100 WBC
PLATELET # BLD AUTO: 197 K/UL (ref 150–450)
PMV BLD AUTO: ABNORMAL FL (ref 9.2–12.9)
RBC # BLD AUTO: 4.21 M/UL (ref 4–5.4)
TRIGL SERPL-MCNC: 59 MG/DL (ref 30–150)
WBC # BLD AUTO: 4.3 K/UL (ref 3.9–12.7)

## 2024-11-03 PROCEDURE — 85025 COMPLETE CBC W/AUTO DIFF WBC: CPT | Performed by: PSYCHIATRY & NEUROLOGY

## 2024-11-03 PROCEDURE — 90833 PSYTX W PT W E/M 30 MIN: CPT | Mod: ,,, | Performed by: PSYCHIATRY & NEUROLOGY

## 2024-11-03 PROCEDURE — 83036 HEMOGLOBIN GLYCOSYLATED A1C: CPT | Performed by: PSYCHIATRY & NEUROLOGY

## 2024-11-03 PROCEDURE — 80061 LIPID PANEL: CPT | Performed by: PSYCHIATRY & NEUROLOGY

## 2024-11-03 PROCEDURE — 36415 COLL VENOUS BLD VENIPUNCTURE: CPT | Performed by: PSYCHIATRY & NEUROLOGY

## 2024-11-03 PROCEDURE — 11400000 HC PSYCH PRIVATE ROOM

## 2024-11-03 PROCEDURE — 99232 SBSQ HOSP IP/OBS MODERATE 35: CPT | Mod: ,,, | Performed by: PSYCHIATRY & NEUROLOGY

## 2024-11-03 PROCEDURE — 25000003 PHARM REV CODE 250: Performed by: PSYCHIATRY & NEUROLOGY

## 2024-11-03 RX ADMIN — BUPROPION HYDROCHLORIDE 150 MG: 150 TABLET, EXTENDED RELEASE ORAL at 08:11

## 2024-11-03 RX ADMIN — FLUOXETINE HYDROCHLORIDE 20 MG: 20 CAPSULE ORAL at 06:11

## 2024-11-03 NOTE — PLAN OF CARE
Pt. Was noticed to be sitting in the dining room, talking to other pts. She denied any c/o auditory or visual hallucinations. She also denied any c/o suicidal or homicidal ideations, only have the SI thoughts but no plans. Encouraged pt. To verbalize her feelings. Will cont. To monitor.  Problem: Depressive Signs/Symptoms  Goal: Optimized Nutrition Intake (Depressive Signs/Symptoms)  Outcome: Progressing  Goal: Improved Psychomotor Symptoms (Depressive Signs/Symptoms)  Outcome: Progressing  Goal: Improved Sleep (Depressive Signs/Symptoms)  Outcome: Progressing  Goal: Enhanced Social, Occupational or Functional Skills (Depressive Signs/Symptoms)  Outcome: Progressing     Problem: Anxiety Signs/Symptoms  Goal: Optimized Energy Level (Anxiety Signs/Symptoms)  Outcome: Progressing  Goal: Optimized Cognitive Function (Anxiety Signs/Symptoms)  Outcome: Progressing  Goal: Improved Mood Symptoms (Anxiety Signs/Symptoms)  Outcome: Progressing  Goal: Improved Sleep (Anxiety Signs/Symptoms)  Outcome: Progressing  Goal: Enhanced Social, Occupational or Functional Skills (Anxiety Signs/Symptoms)  Outcome: Progressing  Goal: Improved Somatic Symptoms (Anxiety Signs/Symptoms)  Outcome: Progressing     Problem: Suicidal Behavior  Goal: Suicidal Behavior is Absent or Managed  Outcome: Progressing

## 2024-11-03 NOTE — PROGRESS NOTES
"PSYCHIATRY DAILY INPATIENT PROGRESS NOTE  SUBSEQUENT HOSPITAL VISIT    ENCOUNTER DATE: 11/3/2024  [facility]    DATE OF ADMISSION: 11/2/2024  7:15 AM  LENGTH OF STAY: 1 days      CHIEF COMPLAINT   Georgia Lares is a 33 y.o. female, seen during daily lima rounds on the inpatient unit.  Georgia Lares presented with the chief complaint of depression/SI, "I could not go anymore."       The patient was seen and examined. The chart was reviewed.     Reviewed notes from Rns and MD and labs from the last 24 hours.    The patient's case was discussed with the treatment team/care providers today including Rns    Staff reports no behavioral or management issues.     The patient has been compliant with treatment.      Subjective 11/03/2024       Today the patient reports that she is feeling a little better, "getting better rest and sleep really made a big difference." She cites being removed from her stressors and distractions as being very helpful.    Depression/anxiety continued but have decreased.    She feels that her "mind is clearer." She feels less hopeless and less overwhelmed by her stressors.    Yesterday, she had a positive conversation with her sister.      The patient denies any side effects to medications.    At this time symptoms remains severe, functionally and behaviorally impairing and pt remains in need of continued acute inpatient hospitalization for both safety and stabilization.               Psychiatric ROS (observed, reported, or endorsed/denied):  Depressed mood - decreasing slowly  Interest/pleasure/anhedonia: decreasing slowly  Guilt/hopelessness/worthlessness - decreasing slowly  Changes in Sleep - decreasing slowly  Changes in Appetite - decreasing slowly  Changes in Concentration - decreasing slowly  Changes in Energy - decreasing slowly  PMA/R- denies  Suicidal- active/passive ideations - decreasing slowly  Homicidal ideations: active/passive ideations - " No    Hallucinations - No  Delusions - No  Disorganized behavior - No  Disorganized speech - No  Negative symptoms - No    Elevated mood - No  Decreased need for sleep - No  Grandiosity - No  Racing thoughts - No  Impulsivity - No  Irritability- No  Increased energy - No  Distractibility - No  Increase in goal-directed activity or PMA- No    Symptoms of KELLEN - decreasing slowly  Symptoms of Panic Disorder- No  Symptoms of PTSD - stable        Overall progress: Patient is showing mild improvement         Psychotherapy:  Target symptoms: depression, anxiety   Why chosen therapy is appropriate versus another modality: relevant to diagnosis, patient responds to this modality, evidence based practice  Outcome monitoring methods: self-report, observation  Therapeutic intervention type: insight oriented psychotherapy, behavior modifying psychotherapy, supportive psychotherapy, interactive psychotherapy  Topics discussed/themes: relationships difficulties, work stress, building skills sets for symptom management, symptom recognition  The patient's response to the intervention is accepting. The patient's progress toward treatment goals is fair   Duration of intervention: 16 minutes        Medical ROS  ROS      PAST MEDICAL HISTORY   Past Medical History:   Diagnosis Date    Anemia            PSYCHOTROPIC MEDICATIONS   Scheduled Meds:   buPROPion  150 mg Oral Daily    FLUoxetine  20 mg Oral QAM     Continuous Infusions:  PRN Meds:.  Current Facility-Administered Medications:     acetaminophen, 650 mg, Oral, Q6H PRN    aluminum-magnesium hydroxide-simethicone, 30 mL, Oral, Q6H PRN    benzonatate, 100 mg, Oral, TID PRN    benztropine mesylate, 2 mg, Intramuscular, Q8H PRN    hydrOXYzine pamoate, 50 mg, Oral, Q6H PRN    loperamide, 2 mg, Oral, PRN    nicotine, 1 patch, Transdermal, Daily PRN    OLANZapine, 10 mg, Oral, Q8H PRN **AND** OLANZapine, 10 mg, Intramuscular, Q8H PRN    ondansetron, 4 mg, Oral, Q8H PRN    promethazine,  "25 mg, Oral, Q6H PRN        EXAMINATION    VITALS   Vitals:    11/02/24 0930 11/02/24 1015 11/02/24 1919 11/03/24 0725   BP:  115/61 134/77 131/81   BP Location:   Left arm Left arm   Patient Position:   Sitting Sitting   Pulse:  85 81 97   Resp:  18 18 18   Temp:  98.5 °F (36.9 °C) 98.6 °F (37 °C) 98.3 °F (36.8 °C)   TempSrc:   Oral Oral   SpO2:  100% 100% 100%   Weight: 45.4 kg (100 lb)      Height: 5' 6" (1.676 m)          Body mass index is 16.14 kg/m².      CONSTITUTIONAL  General Appearance: unremarkable, age appropriate     MUSCULOSKELETAL  Muscle Strength and Tone:no dyskinesia, no dystonia, no tremor, no tic  Abnormal Involuntary Movements: No  Gait and Station: non-ataxic     PSYCHIATRIC   Level of Consciousness: awake and alert   Orientation: person, place, time, and situation  Grooming: Hospital garb and Well groomed  Psychomotor Behavior: normal, cooperative, eye contact normal, no PMA/R  Speech: normal tone, normal rate, normal pitch, normal volume, spontaneous  Language: grossly intact, able to name, able to repeat  Mood: anxious and depressed  Affect: Anxious, Consistent with mood, Congruent with thought, and Full  Thought Process: linear, logical  Associations: intact   Thought Content: less SI, denies HI, and no delusions  Perceptions: denies AH and denies  VH  Memory: Able to recall past events, Remote intact, and Recent intact  Attention:Normal and Attends to interview without distraction  Fund of Knowledge: Aware of current events and Vocabulary appropriate   Estimate if Intelligence:  Average based on work/education history, vocabulary and mental status exam  Insight: intact, has awareness of illness- fair  Judgment: behavior is adequate to circumstances, age appropriate- fair      DIAGNOSTIC TESTING   Laboratory Results  Recent Results (from the past 24 hours)   Hemoglobin A1C    Collection Time: 11/03/24  4:30 AM   Result Value Ref Range    Hemoglobin A1C 5.2 4.0 - 5.6 %    Estimated Avg " Glucose 103 68 - 131 mg/dL   Lipid Panel    Collection Time: 11/03/24  4:30 AM   Result Value Ref Range    Cholesterol 110 (L) 120 - 199 mg/dL    Triglycerides 59 30 - 150 mg/dL    HDL 54 40 - 75 mg/dL    LDL Cholesterol 44.2 (L) 63.0 - 159.0 mg/dL    HDL/Cholesterol Ratio 49.1 20.0 - 50.0 %    Total Cholesterol/HDL Ratio 2.0 2.0 - 5.0    Non-HDL Cholesterol 56 mg/dL   CBC Auto Differential    Collection Time: 11/03/24  4:30 AM   Result Value Ref Range    WBC 4.30 3.90 - 12.70 K/uL    RBC 4.21 4.00 - 5.40 M/uL    Hemoglobin 7.1 (L) 12.0 - 16.0 g/dL    Hematocrit 25.7 (L) 37.0 - 48.5 %    MCV 61 (L) 82 - 98 fL    MCH 16.9 (L) 27.0 - 31.0 pg    MCHC 27.6 (L) 32.0 - 36.0 g/dL    RDW 26.9 (H) 11.5 - 14.5 %    Platelets 197 150 - 450 K/uL    MPV SEE COMMENT 9.2 - 12.9 fL    Immature Granulocytes 0.2 0.0 - 0.5 %    Gran # (ANC) 2.8 1.8 - 7.7 K/uL    Immature Grans (Abs) 0.01 0.00 - 0.04 K/uL    Lymph # 0.9 (L) 1.0 - 4.8 K/uL    Mono # 0.5 0.3 - 1.0 K/uL    Eos # 0.1 0.0 - 0.5 K/uL    Baso # 0.03 0.00 - 0.20 K/uL    nRBC 0 0 /100 WBC    Gran % 65.9 38.0 - 73.0 %    Lymph % 20.9 18.0 - 48.0 %    Mono % 10.7 4.0 - 15.0 %    Eosinophil % 1.6 0.0 - 8.0 %    Basophil % 0.7 0.0 - 1.9 %    Differential Method Automated              MEDICAL DECISION MAKING      ASSESSMENT:   MDD, recurrent, severe without psychotic features  Unspecified Anxiety Disorder     Psychosocial stressors     Cannabis abuse  H/o problematic alcohol use     Elevated TSH  Iron deficiency anemia           PROBLEM LIST AND MANAGEMENT PLANS     Depression: pt counseled  - resumed/continue Prozac 20 mg po q q day  -resumed/continue Wellbutrin  mg po q day     Anxiety: pt counseled  -meds as above  -vistaril prn     Psychosocial stressors: pt counseled  -SW consulted to assist with resources      Cannabis abuse: pt counseled  -recommend cessation     H/o problematic alcohol use: pt counseled  -recommend continued cessation     Elevated TSH: pt  counseled  -monitor for now  -med consulted      Iron deficiency anemia: pt counseled  -monitor for now  -med consulted                Discussed diagnosis, risks and benefits of proposed treatment vs alternative treatments vs no treatment, potential side effects of these treatments and the inherent unpredictability of treatment. The patient expresses understanding of the above and displays the capacity to agree with this treatment given said understanding. Patient also agrees that, currently, the benefits outweigh the risks and would like to pursue/continue treatment at this time.    Any medications being used off-label were discussed with the patient inclusive of the evidence base for the use of the medications and consent was obtained for the off-label use of the medication.       DISCHARGE PLANNING  Expected Disposition Plan: Home or Self Care      NEED FOR CONTINUED HOSPITALIZATION  Psychiatric illness continues to pose a potential threat to life or bodily function, of self or others, thereby requiring the need for continued inpatient psychiatric hospitalization: Yes, due to: danger to self, gravely disabled, and suicidal ideation, as evidenced by:  Ongoing concerns with SI. and Ongoing concerns with grave disability with patient unable to perform basic feeding, hygiene and dressing activities without significant constant support.    Protective inpatient pyschiatric hospitalization required while a safe disposition plan is enacted: Yes    Patient stabilized and ready for discharge from inpatient psychiatric unit: No        STAFF:   Prudencio Wood MD  Psychiatry

## 2024-11-03 NOTE — PLAN OF CARE
Problem: Adult Behavioral Health Plan of Care  Goal: Plan of Care Review  Outcome: Not Progressing  Goal: Patient-Specific Goal (Individualization)  Outcome: Not Progressing  Goal: Adheres to Safety Considerations for Self and Others  Outcome: Not Progressing  Goal: Absence of New-Onset Illness or Injury  Outcome: Not Progressing  Goal: Optimized Coping Skills in Response to Life Stressors  Outcome: Not Progressing  Goal: Develops/Participates in Therapeutic [place] to Support Successful Transition  Outcome: Not Progressing  Goal: Rounds/Family Conference  Outcome: Not Progressing

## 2024-11-04 LAB
BASOPHILS # BLD AUTO: 0.03 K/UL (ref 0–0.2)
BASOPHILS NFR BLD: 0.7 % (ref 0–1.9)
DIFFERENTIAL METHOD BLD: ABNORMAL
EOSINOPHIL # BLD AUTO: 0.1 K/UL (ref 0–0.5)
EOSINOPHIL NFR BLD: 1.9 % (ref 0–8)
ERYTHROCYTE [DISTWIDTH] IN BLOOD BY AUTOMATED COUNT: 27.8 % (ref 11.5–14.5)
HCT VFR BLD AUTO: 27 % (ref 37–48.5)
HGB BLD-MCNC: 7.4 G/DL (ref 12–16)
IMM GRANULOCYTES # BLD AUTO: 0.01 K/UL (ref 0–0.04)
IMM GRANULOCYTES NFR BLD AUTO: 0.2 % (ref 0–0.5)
LYMPHOCYTES # BLD AUTO: 1.1 K/UL (ref 1–4.8)
LYMPHOCYTES NFR BLD: 26.4 % (ref 18–48)
MCH RBC QN AUTO: 16.9 PG (ref 27–31)
MCHC RBC AUTO-ENTMCNC: 27.4 G/DL (ref 32–36)
MCV RBC AUTO: 62 FL (ref 82–98)
MONOCYTES # BLD AUTO: 0.5 K/UL (ref 0.3–1)
MONOCYTES NFR BLD: 10.6 % (ref 4–15)
NEUTROPHILS # BLD AUTO: 2.6 K/UL (ref 1.8–7.7)
NEUTROPHILS NFR BLD: 60.2 % (ref 38–73)
NRBC BLD-RTO: 0 /100 WBC
PLATELET # BLD AUTO: 218 K/UL (ref 150–450)
PMV BLD AUTO: ABNORMAL FL (ref 9.2–12.9)
RBC # BLD AUTO: 4.39 M/UL (ref 4–5.4)
WBC # BLD AUTO: 4.24 K/UL (ref 3.9–12.7)

## 2024-11-04 PROCEDURE — 11400000 HC PSYCH PRIVATE ROOM

## 2024-11-04 PROCEDURE — 36415 COLL VENOUS BLD VENIPUNCTURE: CPT | Performed by: PSYCHIATRY & NEUROLOGY

## 2024-11-04 PROCEDURE — 85025 COMPLETE CBC W/AUTO DIFF WBC: CPT | Performed by: PSYCHIATRY & NEUROLOGY

## 2024-11-04 PROCEDURE — 99232 SBSQ HOSP IP/OBS MODERATE 35: CPT | Mod: ,,, | Performed by: PSYCHIATRY & NEUROLOGY

## 2024-11-04 PROCEDURE — 25000003 PHARM REV CODE 250: Performed by: PSYCHIATRY & NEUROLOGY

## 2024-11-04 PROCEDURE — 90833 PSYTX W PT W E/M 30 MIN: CPT | Mod: ,,, | Performed by: PSYCHIATRY & NEUROLOGY

## 2024-11-04 RX ORDER — FLUOXETINE HYDROCHLORIDE 20 MG/1
40 CAPSULE ORAL EVERY MORNING
Status: DISCONTINUED | OUTPATIENT
Start: 2024-11-05 | End: 2024-11-06 | Stop reason: HOSPADM

## 2024-11-04 RX ADMIN — BUPROPION HYDROCHLORIDE 150 MG: 150 TABLET, EXTENDED RELEASE ORAL at 08:11

## 2024-11-04 RX ADMIN — ACETAMINOPHEN 650 MG: 325 TABLET ORAL at 08:11

## 2024-11-04 RX ADMIN — FLUOXETINE HYDROCHLORIDE 20 MG: 20 CAPSULE ORAL at 05:11

## 2024-11-04 RX ADMIN — HYDROXYZINE PAMOATE 50 MG: 50 CAPSULE ORAL at 08:11

## 2024-11-04 NOTE — PLAN OF CARE
Problem: Adult Behavioral Health Plan of Care  Goal: Plan of Care Review  Outcome: Progressing  Goal: Patient-Specific Goal (Individualization)  Outcome: Progressing  Goal: Adheres to Safety Considerations for Self and Others  Outcome: Progressing  Goal: Absence of New-Onset Illness or Injury  Outcome: Progressing  Goal: Optimized Coping Skills in Response to Life Stressors  Outcome: Progressing     Problem: Depressive Signs/Symptoms  Goal: Optimized Energy Level (Depressive Signs/Symptoms)  Outcome: Progressing  Goal: Optimized Cognitive Function (Depressive Signs/Symptoms)  Outcome: Progressing  Goal: Increased Participation and Engagement (Depressive Signs/Symptoms)  Outcome: Progressing  Goal: Enhanced Self-Esteem and Confidence (Depressive Signs/Symptoms)  Outcome: Progressing  Goal: Improved Mood Symptoms (Depressive Signs/Symptoms)  Outcome: Progressing  Goal: Optimized Nutrition Intake (Depressive Signs/Symptoms)  Outcome: Progressing  Goal: Improved Psychomotor Symptoms (Depressive Signs/Symptoms)  Outcome: Progressing  Goal: Improved Sleep (Depressive Signs/Symptoms)  Outcome: Progressing  Goal: Enhanced Social, Occupational or Functional Skills (Depressive Signs/Symptoms)  Outcome: Progressing     Problem: Anxiety Signs/Symptoms  Goal: Optimized Energy Level (Anxiety Signs/Symptoms)  Outcome: Progressing  Goal: Optimized Cognitive Function (Anxiety Signs/Symptoms)  Outcome: Progressing  Goal: Improved Mood Symptoms (Anxiety Signs/Symptoms)  Outcome: Progressing  Goal: Improved Sleep (Anxiety Signs/Symptoms)  Outcome: Progressing  Goal: Enhanced Social, Occupational or Functional Skills (Anxiety Signs/Symptoms)  Outcome: Progressing  Goal: Improved Somatic Symptoms (Anxiety Signs/Symptoms)  Outcome: Progressing     Problem: Suicidal Behavior  Goal: Suicidal Behavior is Absent or Managed  Outcome: Progressing     Problem: Suicide Risk  Goal: Absence of Self-Harm  Outcome: Progressing

## 2024-11-04 NOTE — PLAN OF CARE
11/04/24 1559   Discharge Assessment   Assessment Type Discharge Planning Assessment   Confirmed/corrected address, phone number and insurance Yes   Confirmed Demographics Correct on Facesheet   Source of Information patient   When was your last doctors appointment?   (11/1/24)   Reason For Admission depression, feeling overwhelmed   People in Home alone   Facility Arrived From: [facility]   Do you expect to return to your current living situation? Yes   Do you have help at home or someone to help you manage your care at home? No   Prior to hospitilization cognitive status: Unable to Assess   Current cognitive status: Alert/Oriented;No Deficits   Walking or Climbing Stairs Difficulty no   Dressing/Bathing Difficulty no   Do you have any problems with:   (n/a)   Home Accessibility not wheelchair accessible   Home Layout Able to live on 1st floor   Equipment Currently Used at Home none   Readmission within 30 days? No   Patient currently being followed by outpatient case management? No   Do you currently have service(s) that help you manage your care at home? No   Do you take prescription medications? No   Do you have prescription coverage? Yes   Do you have any problems affording any of your prescribed medications? No   Is the patient taking medications as prescribed?   (n/a)   Who is going to help you get home at discharge? sister, Yaa Lares   How do you get to doctors appointments? family or friend will provide   Are you on dialysis? No   Do you take coumadin? No   Discharge Plan A Home   DME Needed Upon Discharge  none   Discharge Plan discussed with: Patient   Transition of Care Barriers Mental illness   SDOH   (n/a)   Physical Activity   On average, how many days per week do you engage in moderate to strenuous exercise (like a brisk walk)? 5 days   On average, how many minutes do you engage in exercise at this level? 60 min   Financial Resource Strain   How hard is it for you to pay for the  very basics like food, housing, medical care, and heating? Not very   Housing Stability   In the last 12 months, was there a time when you were not able to pay the mortgage or rent on time? N   At any time in the past 12 months, were you homeless or living in a shelter (including now)? N   Transportation Needs   Has the lack of transportation kept you from medical appointments, meetings, work or from getting things needed for daily living? No   Food Insecurity   Within the past 12 months, you worried that your food would run out before you got the money to buy more. Never true   Within the past 12 months, the food you bought just didn't last and you didn't have money to get more. Never true   Stress   Do you feel stress - tense, restless, nervous, or anxious, or unable to sleep at night because your mind is troubled all the time - these days? Only a littl   Social Isolation   How often do you feel lonely or isolated from those around you?  Sometimes   Alcohol Use   Q1: How often do you have a drink containing alcohol? Monthly or l   Q2: How many drinks containing alcohol do you have on a typical day when you are drinking? 1 or 2   Q3: How often do you have six or more drinks on one occasion? Never   Utilities   In the past 12 months has the electric, gas, oil, or water company threatened to shut off services in your home? No   Health Literacy   How often do you need to have someone help you when you read instructions, pamphlets, or other written material from your doctor or pharmacy? Never

## 2024-11-04 NOTE — PLAN OF CARE
Pt was completing initial assessment interview at the time of group session. Staff will continue to encourage pt to participate in process groups to verbalize feelings and develop healthy coping skills.

## 2024-11-04 NOTE — NURSING
POC reviewed this shift and is ongoing.  Pt is cooperative with care and complaint with medications.  Pt reports feeling better since admission.  Denies SI/HI/AVH.  Tolerated meals.

## 2024-11-04 NOTE — PLAN OF CARE
Collateral:    Yaa Lares, sister, [phone]    Collateral Perception of Problem:   She stopped taking her medication about a month ago and didn't tell anyone and there was a lot of stress at work, she was overwhelmed and not on her medication and I believe things were starting to get to her     Previous Psych History/Hospitalizations:  None     Suicide Attempts (how/severity):  Never     How long has pt had problems (childhood dx?):  N/A     Impulse issues:  None     History of violence:     None   Drug Use:  None     Alcohol Use:  Socially     Legal Issues:  None     Other Pertinent Info:   She has certain stressors like everyone else, her job can be a big stressor   She is the only fluent [place]ik speaker at her job, the pressures of that gets thrown on her a lot     Baseline:  Normal, calm    Discharge Plan:   Her apartment and work

## 2024-11-04 NOTE — PLAN OF CARE
11/04/24 1604   Step 1: Warning Signs   Warning Sign 1 when stressed, triggers my anxiety   Warning Sign 2 n/a   Warning Sign 3 n/a   Step 2: Internal coping strategies - Things I can do to take my mind off my problems without contacting another person:   Coping Strategy 1 deep breathing techniques   Coping Strategy 2 remind myself that some things are out of my control   Coping Strategy 3 reminding self i have everything i need, job, food, home, good support system   Step 3: People and social settings that provide distraction:   1. Name Yaa Lares (sister)       Phone [phone]   2. Name Natasha Parsons       Phone [phone]   3. Place family gatherings   4. Place dinner/lunch with friends    Step 4: People whom I can ask for help:   1. Person Michelle Lares (father)       Phone [phone]   2. Person Yaa Lares (sister)       Phone [phone]   3. Person Natasha Parsons       Phone [phone]   Step 5: Professionals or agencies I can contact during a crisis:   1. Clinician Name Dr. Ney Mosquera MD [id number], [place] [address]       Phone [phone]   2. Clinician Name Dr. Zahira Kuo  [address]  [place], [address] (therapist)       Phone [phone]   3. Suicide Prevention Lifeline: 988 Suicide Prevention Lifeline 988   4. [facility] Emergency Service       911       Emergency Services Phone Warm line [phone] wed-sun 5pm-10pm   Step 6: Making the environment safer (plan for lethal means safety)   Safe Environment Plan not quitting my medications cold turkey   Safe Environment Optional: What is most important to me and worth living for? family and friends   Safety Plan Tasks   Provided a Hard Copy to the Patient Y   Explained How to Follow the Steps Y   Discussed Facilitators and Barriers Y

## 2024-11-04 NOTE — PROGRESS NOTES
"PSYCHIATRY DAILY INPATIENT PROGRESS NOTE  SUBSEQUENT HOSPITAL VISIT    ENCOUNTER DATE: 11/4/2024  [facility] [place]    DATE OF ADMISSION: 11/2/2024  7:15 AM  LENGTH OF STAY: 2 days      CHIEF COMPLAINT   Georgia Lares is a 33 y.o. female, seen during daily lima rounds on the inpatient unit.  Georgia Lares presented with the chief complaint of depression/SI, "I could not go anymore."       The patient was seen and examined. The chart was reviewed.     Reviewed notes from Rns and MD and labs from the last 24 hours.    The patient's case was discussed with the treatment team/care providers today including Rns    Staff reports no behavioral or management issues.     The patient has been compliant with treatment.      Subjective 11/04/2024    Patient reports mood is "better", adding that she had "a good conversation with my sisters"- Patient reports that strained relationship with siblings was major recent stressor. She endorses recent suicidal ideation without plan which she states is improving. Denies SE to current medication regimen.     Patient notes that she was previously prescribed prozac 60 mg and wellbutrin 150 which she discontinued several months ago due to "feeling better". She is amenable to continuing treatment. Patient counseled on risks of abrupt discontinuation.                     Yesterday, she had a positive conversation with her sister.                         Psychiatric ROS (observed, reported, or endorsed/denied):  Depressed mood - decreasing slowly  Interest/pleasure/anhedonia: decreasing slowly  Guilt/hopelessness/worthlessness - decreasing slowly  Changes in Sleep - decreasing slowly  Changes in Appetite - decreasing slowly  Changes in Concentration - decreasing slowly  Changes in Energy - decreasing slowly  PMA/R- denies  Suicidal- active/passive ideations - decreasing slowly  Homicidal ideations: active/passive ideations - No    Hallucinations - No  Delusions - " No  Disorganized behavior - No  Disorganized speech - No  Negative symptoms - No    Elevated mood - No  Decreased need for sleep - No  Grandiosity - No  Racing thoughts - No  Impulsivity - No  Irritability- No  Increased energy - No  Distractibility - No  Increase in goal-directed activity or PMA- No    Symptoms of KELLEN - decreasing slowly  Symptoms of Panic Disorder- No  Symptoms of PTSD - stable        Overall progress: Patient is showing mild improvement         Psychotherapy:  Target symptoms: depression, anxiety   Why chosen therapy is appropriate versus another modality: relevant to diagnosis, patient responds to this modality, evidence based practice  Outcome monitoring methods: self-report, observation  Therapeutic intervention type: insight oriented psychotherapy, behavior modifying psychotherapy, supportive psychotherapy, interactive psychotherapy  Topics discussed/themes: relationships difficulties, work stress, building skills sets for symptom management, symptom recognition  The patient's response to the intervention is accepting. The patient's progress toward treatment goals is fair   Duration of intervention: 16 minutes        Medical ROS  ROS      PAST MEDICAL HISTORY   Past Medical History:   Diagnosis Date    Anemia            PSYCHOTROPIC MEDICATIONS   Scheduled Meds:   buPROPion  150 mg Oral Daily    FLUoxetine  20 mg Oral QAM     Continuous Infusions:  PRN Meds:.  Current Facility-Administered Medications:     acetaminophen, 650 mg, Oral, Q6H PRN    aluminum-magnesium hydroxide-simethicone, 30 mL, Oral, Q6H PRN    benzonatate, 100 mg, Oral, TID PRN    benztropine mesylate, 2 mg, Intramuscular, Q8H PRN    hydrOXYzine pamoate, 50 mg, Oral, Q6H PRN    loperamide, 2 mg, Oral, PRN    nicotine, 1 patch, Transdermal, Daily PRN    OLANZapine, 10 mg, Oral, Q8H PRN **AND** OLANZapine, 10 mg, Intramuscular, Q8H PRN    ondansetron, 4 mg, Oral, Q8H PRN    promethazine, 25 mg, Oral, Q6H  PRN        EXAMINATION    VITALS   Vitals:    11/02/24 1919 11/03/24 0725 11/03/24 1918 11/04/24 0742   BP: 134/77 131/81 126/74 119/67   BP Location: Left arm Left arm Left arm Left arm   Patient Position: Sitting Sitting Sitting Sitting   Pulse: 81 97 74 90   Resp: 18 18 18 20   Temp: 98.6 °F (37 °C) 98.3 °F (36.8 °C) 98.1 °F (36.7 °C) 98.9 °F (37.2 °C)   TempSrc: Oral Oral Oral Oral   SpO2: 100% 100% 100% 100%   Weight:       Height:           Body mass index is 16.14 kg/m².      CONSTITUTIONAL  General Appearance: unremarkable, age appropriate     MUSCULOSKELETAL  Muscle Strength and Tone:no dyskinesia, no dystonia, no tremor, no tic  Abnormal Involuntary Movements: No  Gait and Station: non-ataxic     PSYCHIATRIC   Level of Consciousness: awake and alert   Orientation: person, place, time, and situation  Grooming: Hospital garb and Well groomed  Psychomotor Behavior: normal, cooperative, eye contact normal, no PMA/R  Speech: normal tone, normal rate, normal pitch, normal volume, spontaneous  Language: grossly intact, able to name, able to repeat  Mood: anxious and depressed  Affect: Anxious, Consistent with mood, Congruent with thought, and Full  Thought Process: linear, logical  Associations: intact   Thought Content: less SI, denies HI, and no delusions  Perceptions: denies AH and denies  VH  Memory: Able to recall past events, Remote intact, and Recent intact  Attention:Normal and Attends to interview without distraction  Fund of Knowledge: Aware of current events and Vocabulary appropriate   Estimate if Intelligence:  Average based on work/education history, vocabulary and mental status exam  Insight: intact, has awareness of illness- fair  Judgment: behavior is adequate to circumstances, age appropriate- fair      DIAGNOSTIC TESTING   Laboratory Results  Recent Results (from the past 24 hours)   CBC Auto Differential    Collection Time: 11/04/24  6:06 AM   Result Value Ref Range    WBC 4.24 3.90 - 12.70  K/uL    RBC 4.39 4.00 - 5.40 M/uL    Hemoglobin 7.4 (L) 12.0 - 16.0 g/dL    Hematocrit 27.0 (L) 37.0 - 48.5 %    MCV 62 (L) 82 - 98 fL    MCH 16.9 (L) 27.0 - 31.0 pg    MCHC 27.4 (L) 32.0 - 36.0 g/dL    RDW 27.8 (H) 11.5 - 14.5 %    Platelets 218 150 - 450 K/uL    MPV SEE COMMENT 9.2 - 12.9 fL    Immature Granulocytes 0.2 0.0 - 0.5 %    Gran # (ANC) 2.6 1.8 - 7.7 K/uL    Immature Grans (Abs) 0.01 0.00 - 0.04 K/uL    Lymph # 1.1 1.0 - 4.8 K/uL    Mono # 0.5 0.3 - 1.0 K/uL    Eos # 0.1 0.0 - 0.5 K/uL    Baso # 0.03 0.00 - 0.20 K/uL    nRBC 0 0 /100 WBC    Gran % 60.2 38.0 - 73.0 %    Lymph % 26.4 18.0 - 48.0 %    Mono % 10.6 4.0 - 15.0 %    Eosinophil % 1.9 0.0 - 8.0 %    Basophil % 0.7 0.0 - 1.9 %    Differential Method Automated              MEDICAL DECISION MAKING      ASSESSMENT:   MDD, recurrent, severe without psychotic features  Unspecified Anxiety Disorder     Psychosocial stressors     Cannabis abuse  H/o problematic alcohol use     Elevated TSH  Iron deficiency anemia           PROBLEM LIST AND MANAGEMENT PLANS     Depression: pt counseled  - resumed/continue Prozac 20 mg po q q day-increase to 40 mg tomorrow  -resumed/continue Wellbutrin  mg po q day     Anxiety: pt counseled  -meds as above  -vistaril prn     Psychosocial stressors: pt counseled  -SW consulted to assist with resources      Cannabis abuse: pt counseled  -recommend cessation     H/o problematic alcohol use: pt counseled  -recommend continued cessation     Elevated TSH: pt counseled  -monitor for now  -med consulted      Iron deficiency anemia: pt counseled  -monitor for now  -med consulted                Discussed diagnosis, risks and benefits of proposed treatment vs alternative treatments vs no treatment, potential side effects of these treatments and the inherent unpredictability of treatment. The patient expresses understanding of the above and displays the capacity to agree with this treatment given said understanding. Patient  also agrees that, currently, the benefits outweigh the risks and would like to pursue/continue treatment at this time.    Any medications being used off-label were discussed with the patient inclusive of the evidence base for the use of the medications and consent was obtained for the off-label use of the medication.       DISCHARGE PLANNING  Expected Disposition Plan: Home or Self Care      NEED FOR CONTINUED HOSPITALIZATION  Psychiatric illness continues to pose a potential threat to life or bodily function, of self or others, thereby requiring the need for continued inpatient psychiatric hospitalization: Yes, due to: danger to self, gravely disabled, and suicidal ideation, as evidenced by:  Ongoing concerns with SI. and Ongoing concerns with grave disability with patient unable to perform basic feeding, hygiene and dressing activities without significant constant support.    Protective inpatient pyschiatric hospitalization required while a safe disposition plan is enacted: Yes    Patient stabilized and ready for discharge from inpatient psychiatric unit: No        STAFF:   Harry Enamorado NP  Psychiatry

## 2024-11-04 NOTE — NURSING
Status has been unchanged throughout the shift.  Pt remains depressed, anxious. Pt has been in the common areas most of the day.  Pt denies si, hi, a v hallucinations.  Gravely disabled.  Will cont to monitor for safety.

## 2024-11-04 NOTE — NURSING
Pt. Noticed to be sitting in dining room watching TV with other Pts. She remains sad, anxious and depressed at times, but able to smile and hold a conversation with staff members and other Pts. Will cont. To monitor Pts.
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
## 2024-11-05 LAB
ANISOCYTOSIS BLD QL SMEAR: SLIGHT
BASOPHILS # BLD AUTO: 0.05 K/UL (ref 0–0.2)
BASOPHILS NFR BLD: 1.1 % (ref 0–1.9)
DACRYOCYTES BLD QL SMEAR: ABNORMAL
DIFFERENTIAL METHOD BLD: ABNORMAL
EOSINOPHIL # BLD AUTO: 0.1 K/UL (ref 0–0.5)
EOSINOPHIL NFR BLD: 2.1 % (ref 0–8)
ERYTHROCYTE [DISTWIDTH] IN BLOOD BY AUTOMATED COUNT: 28.2 % (ref 11.5–14.5)
HCT VFR BLD AUTO: 27.7 % (ref 37–48.5)
HGB BLD-MCNC: 7.6 G/DL (ref 12–16)
HYPOCHROMIA BLD QL SMEAR: ABNORMAL
IMM GRANULOCYTES # BLD AUTO: 0.01 K/UL (ref 0–0.04)
IMM GRANULOCYTES NFR BLD AUTO: 0.2 % (ref 0–0.5)
LYMPHOCYTES # BLD AUTO: 1.4 K/UL (ref 1–4.8)
LYMPHOCYTES NFR BLD: 28.4 % (ref 18–48)
MCH RBC QN AUTO: 17 PG (ref 27–31)
MCHC RBC AUTO-ENTMCNC: 27.4 G/DL (ref 32–36)
MCV RBC AUTO: 62 FL (ref 82–98)
MONOCYTES # BLD AUTO: 0.5 K/UL (ref 0.3–1)
MONOCYTES NFR BLD: 10.5 % (ref 4–15)
NEUTROPHILS # BLD AUTO: 2.7 K/UL (ref 1.8–7.7)
NEUTROPHILS NFR BLD: 57.7 % (ref 38–73)
NRBC BLD-RTO: 0 /100 WBC
PLATELET # BLD AUTO: 245 K/UL (ref 150–450)
PMV BLD AUTO: ABNORMAL FL (ref 9.2–12.9)
POIKILOCYTOSIS BLD QL SMEAR: SLIGHT
RBC # BLD AUTO: 4.47 M/UL (ref 4–5.4)
WBC # BLD AUTO: 4.75 K/UL (ref 3.9–12.7)

## 2024-11-05 PROCEDURE — 90833 PSYTX W PT W E/M 30 MIN: CPT | Mod: ,,, | Performed by: PSYCHIATRY & NEUROLOGY

## 2024-11-05 PROCEDURE — 36415 COLL VENOUS BLD VENIPUNCTURE: CPT | Performed by: PSYCHIATRY & NEUROLOGY

## 2024-11-05 PROCEDURE — 11400000 HC PSYCH PRIVATE ROOM

## 2024-11-05 PROCEDURE — 85025 COMPLETE CBC W/AUTO DIFF WBC: CPT | Performed by: PSYCHIATRY & NEUROLOGY

## 2024-11-05 PROCEDURE — 25000003 PHARM REV CODE 250: Performed by: PSYCHIATRY & NEUROLOGY

## 2024-11-05 PROCEDURE — 99232 SBSQ HOSP IP/OBS MODERATE 35: CPT | Mod: ,,, | Performed by: PSYCHIATRY & NEUROLOGY

## 2024-11-05 RX ADMIN — FLUOXETINE HYDROCHLORIDE 40 MG: 20 CAPSULE ORAL at 07:11

## 2024-11-05 RX ADMIN — HYDROXYZINE PAMOATE 50 MG: 50 CAPSULE ORAL at 08:11

## 2024-11-05 RX ADMIN — BUPROPION HYDROCHLORIDE 150 MG: 150 TABLET, EXTENDED RELEASE ORAL at 07:11

## 2024-11-05 NOTE — PLAN OF CARE
Nutrition Plan of Care:    Recommendations     1. Rec'd commercial beverage medical food supplement therapy: Ensure TID    2. Continue on regular, general healthful diet    3. Weight Daily    4. Monitor labs     Goals:   1.Patient to consume >75% of EEN prior to RD follow up     Nutrition Goal Status: new  Communication of RD Recs: other (comment) (Consult, poc)     Assessment and Plan     Nutrition Problem  Underweight      Related to (etiology):   Inadequate energy intake   Psychological causes: depression     Signs and Symptoms (as evidenced by):   Inability to ma     Interventions (treatment strategy):  General Healthful Diet  Commercial beverage medical food supplement therapy      Nutrition Diagnosis Status:   Fransisco Kirkland MS, RDN, LDN

## 2024-11-05 NOTE — PLAN OF CARE
Problem: Adult Behavioral Health Plan of Care  Goal: Develops/Participates in Therapeutic [place] to Support Successful Transition  Intervention: Mutually Develop Transition Plan  Flowsheets (Taken 11/5/2024 1513)  Current Discharge Risk: psychiatric illness  Readmission Within the Last 30 Days: no previous admission in last 30 days  Outpatient/Agency/Support Group Needs:   outpatient medication management   outpatient psychiatric care (specify)   outpatient counseling  Transition Support: follow-up care discussed  Anticipated Discharge Disposition: home or self-care  Transportation Concerns: none  Patient/Family Anticipated Services at Transition:   outpatient care   mental health services  Patient/Family Anticipates Transition to: home  Transportation Anticipated: family or friend will provide  Concerns to be Addressed:   mental health   medication   compliance issue   coping/stress  Patient's Choice of Community Agency(s): Dr. Ney Mosquera MD

## 2024-11-05 NOTE — PLAN OF CARE
Patient calm and cooperative.  Denies ideations.  Expressed that he is felling much better.  No concerns,  slept through the night.

## 2024-11-05 NOTE — CONSULTS
[facility]  Adult Nutrition  Consult Note    SUMMARY     Recommendations    1. Rec'd commercial beverage medical food supplement therapy: Ensure TID    2. Continue on regular, general healthful diet    3. Weight Daily    4. Monitor labs    Goals:   1.Patient to consume >75% of EEN prior to RD follow up    Nutrition Goal Status: new  Communication of RD Recs: other (comment) (Consult, poc)    Assessment and Plan    Nutrition Problem  Underweight     Related to (etiology):   Inadequate energy intake   Psychological causes: depression    Signs and Symptoms (as evidenced by):   Inability to ma    Interventions (treatment strategy):  General Healthful Diet  Commercial beverage medical food supplement therapy     Nutrition Diagnosis Status:   New      Malnutrition Assessment         Micronutrient Evaluation Summary: suspected deficiency  Micronutrient Evaluation Comments: anemial   Weight Loss (Malnutrition):  (BMI: 16.14 (underweight))                         Reason for Assessment    Reason For Assessment: consult (New Admit)    Diagnosis: psychological disorder (depression)  Patient Active Problem List   Diagnosis    Muscle weakness of lower extremity    Peripheral neuropathy    Iron deficiency anemia due to chronic blood loss    Pyelonephritis, acute    Severe sepsis    Anemia requiring transfusions    Depression     Past Medical History:   Diagnosis Date    Anemia        General Information Comments:   11/5/2024: Patient is on a regular oral diet at this time. Noted that patient is tolerating meals.  % of meal consumptions documentation not available at this time.   Patient is underweight with a bmi: 16.14 with a previous weight on 10/2023 of 49.9kg.  Malnutrition is suspected.  On site RD to complete full NFPE is warranted.  No wound noted at this time.  Labs reviewed.  NKFA.  RD to recommend commercial beverages to help fill any nutritional gaps.  LBM: OUMAR.  RD to continue to encourage  "po intake and monitor tolerance.    Nutrition Discharge Planning: General Healthful diet + Ensure TID    Nutrition Risk Screen    Nutrition Risk Screen:  (underweight: BMI: 16.14)  Nutrition Related Social Determinants of Health: SDOH: Unable to assess at this time.     Nutrition/Diet History    Spiritual, Cultural Beliefs, Bahai Practices, Values that Affect Care: no  Food Allergies: NKFA  Factors Affecting Nutritional Intake: depression    Anthropometrics    Temp: 98.4 °F (36.9 °C)  Height Method: Stated  Height: 5' 6" (167.6 cm)  Height (inches): 66 in  Weight Method: Standard Scale  Weight: 45.4 kg (100 lb)  Weight (lb): 100 lb  Ideal Body Weight (IBW), Female: 130 lb  % Ideal Body Weight, Female (lb): 76.92 %  % Ideal Body Weight Malnutrition: 70-79%: moderate deficit  BMI (Calculated): 16.1  BMI Grade: 16 - 16.9 protein-energy malnutrition grade II  Weight Loss: unintentional  Usual Body Weight (UBW), k.9 kg  % Usual Body Weight: 91.09  % Weight Change From Usual Weight: -9.1 %       Lab/Procedures/Meds    Pertinent Labs Reviewed: reviewed  BMP  Lab Results   Component Value Date     2024    K 4.3 2024     2024    CO2 20 (L) 2024    BUN 6 2024    CREATININE 0.7 2024    CALCIUM 10.0 2024    ANIONGAP 11 2024    EGFRNORACEVR >60.0 2024     Lab Results   Component Value Date    HGBA1C 5.2 2024     Lab Results   Component Value Date    CALCIUM 10.0 2024       Pertinent Medications Reviewed: reviewed  Scheduled Meds:   buPROPion  150 mg Oral Daily    FLUoxetine  40 mg Oral QAM     Continuous Infusions:  PRN Meds:.  Current Facility-Administered Medications:     acetaminophen, 650 mg, Oral, Q6H PRN    aluminum-magnesium hydroxide-simethicone, 30 mL, Oral, Q6H PRN    benzonatate, 100 mg, Oral, TID PRN    benztropine mesylate, 2 mg, Intramuscular, Q8H PRN    hydrOXYzine pamoate, 50 mg, Oral, Q6H PRN    loperamide, 2 mg, Oral, PRN    " nicotine, 1 patch, Transdermal, Daily PRN    OLANZapine, 10 mg, Oral, Q8H PRN **AND** OLANZapine, 10 mg, Intramuscular, Q8H PRN    ondansetron, 4 mg, Oral, Q8H PRN    promethazine, 25 mg, Oral, Q6H PRN    Physical Findings/Assessment         Estimated/Assessed Needs    Weight Used For Calorie Calculations: 45.4 kg (100 lb 1.4 oz)  Energy Calorie Requirements (kcal): 30-40kcals/kg (1362-1816kcals/day)  Energy Need Method: Kcal/kg  Protein Requirements: 1.2-1.5g/kg (54-68g/day)  Weight Used For Protein Calculations: 45.4 kg (100 lb 1.4 oz)  Fluid Requirements (mL): 1ml/kcal  Estimated Fluid Requirement Method: RDA Method  RDA Method (mL): 30  CHO Requirement: 225      Nutrition Prescription Ordered    Current Diet Order: Regular  Oral Nutrition Supplement: Ensure TID    Evaluation of Received Nutrient/Fluid Intake    % Kcal Needs: 25-75%  % Protein Needs: 25-75%  I/O: OUMAR  Energy Calories Required: not meeting needs  Protein Required: not meeting needs  Fluid Required: not meeting needs  Comments: LBM:OUMAR  Tolerance: tolerating  % Intake of Estimated Energy Needs: Other: 25-75%  % Meal Intake: Other: monitoring     Nutrition Risk    Level of Risk/Frequency of Follow-up: moderate (Follow up: 1-2x per week)       Monitor and Evaluation    Food and Nutrient Intake: energy intake, food and beverage intake  Food and Nutrient Adminstration: diet order  Physical Activity and Function: factors affecting access to physical activity  Anthropometric Measurements: body mass index, weight change, weight, height/length  Biochemical Data, Medical Tests and Procedures: electrolyte and renal panel, gastrointestinal profile, glucose/endocrine profile, inflammatory profile, lipid profile  Nutrition-Focused Physical Findings: overall appearance       Nutrition Follow-Up    RD Follow-up?: Yes  Leni Kirkland, MS, RDN, LDN

## 2024-11-05 NOTE — PROGRESS NOTES
"PSYCHIATRY DAILY INPATIENT PROGRESS NOTE  SUBSEQUENT HOSPITAL VISIT    ENCOUNTER DATE: 11/5/2024  [facility]    DATE OF ADMISSION: 11/2/2024  7:15 AM  LENGTH OF STAY: 3 days      CHIEF COMPLAINT   Georgia Lares is a 33 y.o. female, seen during daily lima rounds on the inpatient unit.  Georgia Lares presented with the chief complaint of depression/SI, "I could not go anymore."       The patient was seen and examined. The chart was reviewed.     Reviewed notes from Rns and MD and labs from the last 24 hours.    The patient's case was discussed with the treatment team/care providers today including Rns    Staff reports no behavioral or management issues.     The patient has been compliant with treatment.      Subjective 11/05/2024    Patient reports mood is "a lot better," adding "I think just being here the last couple of days has really given me time to think about some things." Reports sleeping well last night, initiating more easily with utilization of hydroxyzine. She continues to deny suicidal ideation. Patient reports plan to continue outpatient therapy and medication management with current outpatient provider.     Patient may be candidate for discharge within 24-48 hours.         Psychiatric ROS (observed, reported, or endorsed/denied):  Depressed mood - decreasing slowly  Interest/pleasure/anhedonia: decreasing slowly  Guilt/hopelessness/worthlessness - decreasing slowly  Changes in Sleep - decreasing slowly  Changes in Appetite - decreasing slowly  Changes in Concentration - decreasing slowly  Changes in Energy - decreasing slowly  PMA/R- denies  Suicidal- active/passive ideations - denies  Homicidal ideations: active/passive ideations - No    Hallucinations - No  Delusions - No  Disorganized behavior - No  Disorganized speech - No  Negative symptoms - No    Elevated mood - No  Decreased need for sleep - No  Grandiosity - No  Racing thoughts - No  Impulsivity - No  Irritability- " No  Increased energy - No  Distractibility - No  Increase in goal-directed activity or PMA- No    Symptoms of KELLEN - decreasing slowly  Symptoms of Panic Disorder- No  Symptoms of PTSD - stable        Overall progress: Patient is showing moderate improvement        Psychotherapy:  Target symptoms: depression, anxiety   Why chosen therapy is appropriate versus another modality: relevant to diagnosis, patient responds to this modality, evidence based practice  Outcome monitoring methods: self-report, observation  Therapeutic intervention type: insight oriented psychotherapy, behavior modifying psychotherapy, supportive psychotherapy, interactive psychotherapy  Topics discussed/themes: relationships difficulties, work stress, building skills sets for symptom management, symptom recognition  The patient's response to the intervention is accepting. The patient's progress toward treatment goals is fair   Duration of intervention: 16 minutes        Medical ROS  Review of Systems   Constitutional: Negative.    HENT: Negative.     Eyes: Negative.    Respiratory: Negative.     Cardiovascular: Negative.    Gastrointestinal: Negative.    Genitourinary: Negative.    Musculoskeletal: Negative.    Skin: Negative.    Neurological: Negative.    Endo/Heme/Allergies: Negative.    Psychiatric/Behavioral:          As noted         PAST MEDICAL HISTORY   Past Medical History:   Diagnosis Date    Anemia            PSYCHOTROPIC MEDICATIONS   Scheduled Meds:   buPROPion  150 mg Oral Daily    FLUoxetine  40 mg Oral QAM     Continuous Infusions:  PRN Meds:.  Current Facility-Administered Medications:     acetaminophen, 650 mg, Oral, Q6H PRN    aluminum-magnesium hydroxide-simethicone, 30 mL, Oral, Q6H PRN    benzonatate, 100 mg, Oral, TID PRN    benztropine mesylate, 2 mg, Intramuscular, Q8H PRN    hydrOXYzine pamoate, 50 mg, Oral, Q6H PRN    loperamide, 2 mg, Oral, PRN    nicotine, 1 patch, Transdermal, Daily PRN    OLANZapine, 10 mg, Oral,  Q8H PRN **AND** OLANZapine, 10 mg, Intramuscular, Q8H PRN    ondansetron, 4 mg, Oral, Q8H PRN    promethazine, 25 mg, Oral, Q6H PRN        EXAMINATION    VITALS   Vitals:    11/03/24 1918 11/04/24 0742 11/04/24 1920 11/05/24 0739   BP: 126/74 119/67 114/75 137/65   BP Location: Left arm Left arm Left arm Left arm   Patient Position: Sitting Sitting Sitting Sitting   Pulse: 74 90 90 98   Resp: 18 20 20 20   Temp: 98.1 °F (36.7 °C) 98.9 °F (37.2 °C) 99.1 °F (37.3 °C) 98.4 °F (36.9 °C)   TempSrc: Oral Oral Oral Oral   SpO2: 100% 100% 100% 100%   Weight:       Height:           Body mass index is 16.14 kg/m².      CONSTITUTIONAL  General Appearance: unremarkable, age appropriate     MUSCULOSKELETAL  Muscle Strength and Tone:no dyskinesia, no dystonia, no tremor, no tic  Abnormal Involuntary Movements: No  Gait and Station: non-ataxic     PSYCHIATRIC   Level of Consciousness: awake and alert   Orientation: person, place, time, and situation  Grooming: Hospital garb and Well groomed  Psychomotor Behavior: normal, cooperative, eye contact normal, no PMA/R  Speech: normal tone, normal rate, normal pitch, normal volume, spontaneous  Language: grossly intact, able to name, able to repeat  Mood: anxious and depressed  Affect: Anxious, Consistent with mood, Congruent with thought, and Full  Thought Process: linear, logical  Associations: intact   Thought Content: less SI, denies HI, and no delusions  Perceptions: denies AH and denies  VH  Memory: Able to recall past events, Remote intact, and Recent intact  Attention:Normal and Attends to interview without distraction  Fund of Knowledge: Aware of current events and Vocabulary appropriate   Estimate if Intelligence:  Average based on work/education history, vocabulary and mental status exam  Insight: intact, has awareness of illness- fair  Judgment: behavior is adequate to circumstances, age appropriate- fair      DIAGNOSTIC TESTING   Laboratory Results  Recent Results (from the  past 24 hours)   CBC Auto Differential    Collection Time: 11/05/24  6:10 AM   Result Value Ref Range    WBC 4.75 3.90 - 12.70 K/uL    RBC 4.47 4.00 - 5.40 M/uL    Hemoglobin 7.6 (L) 12.0 - 16.0 g/dL    Hematocrit 27.7 (L) 37.0 - 48.5 %    MCV 62 (L) 82 - 98 fL    MCH 17.0 (L) 27.0 - 31.0 pg    MCHC 27.4 (L) 32.0 - 36.0 g/dL    RDW 28.2 (H) 11.5 - 14.5 %    Platelets 245 150 - 450 K/uL    MPV SEE COMMENT 9.2 - 12.9 fL    Immature Granulocytes 0.2 0.0 - 0.5 %    Gran # (ANC) 2.7 1.8 - 7.7 K/uL    Immature Grans (Abs) 0.01 0.00 - 0.04 K/uL    Lymph # 1.4 1.0 - 4.8 K/uL    Mono # 0.5 0.3 - 1.0 K/uL    Eos # 0.1 0.0 - 0.5 K/uL    Baso # 0.05 0.00 - 0.20 K/uL    nRBC 0 0 /100 WBC    Gran % 57.7 38.0 - 73.0 %    Lymph % 28.4 18.0 - 48.0 %    Mono % 10.5 4.0 - 15.0 %    Eosinophil % 2.1 0.0 - 8.0 %    Basophil % 1.1 0.0 - 1.9 %    Aniso Slight     Poik Slight     Hypo Moderate     Tear Drop Cells Occasional     Differential Method Automated              MEDICAL DECISION MAKING      ASSESSMENT:   MDD, recurrent, severe without psychotic features  Unspecified Anxiety Disorder     Psychosocial stressors     Cannabis abuse  H/o problematic alcohol use     Elevated TSH  Iron deficiency anemia           PROBLEM LIST AND MANAGEMENT PLANS     Depression: pt counseled  - resumed/continue Prozac 20 mg po q q day-increase to 40 mg today  -resumed/continue Wellbutrin  mg po q day     Anxiety: pt counseled  -meds as above  -vistaril prn     Psychosocial stressors: pt counseled  -SW consulted to assist with resources      Cannabis abuse: pt counseled  -recommend cessation     H/o problematic alcohol use: pt counseled  -recommend continued cessation     Elevated TSH: pt counseled  -monitor for now  -med consulted      Iron deficiency anemia: pt counseled  -monitor for now  -med consulted                Discussed diagnosis, risks and benefits of proposed treatment vs alternative treatments vs no treatment, potential side effects of  these treatments and the inherent unpredictability of treatment. The patient expresses understanding of the above and displays the capacity to agree with this treatment given said understanding. Patient also agrees that, currently, the benefits outweigh the risks and would like to pursue/continue treatment at this time.    Any medications being used off-label were discussed with the patient inclusive of the evidence base for the use of the medications and consent was obtained for the off-label use of the medication.       DISCHARGE PLANNING  Expected Disposition Plan: Home or Self Care      NEED FOR CONTINUED HOSPITALIZATION  Psychiatric illness continues to pose a potential threat to life or bodily function, of self or others, thereby requiring the need for continued inpatient psychiatric hospitalization: Yes, due to: danger to self, gravely disabled, and suicidal ideation, as evidenced by:  Ongoing concerns with SI. and Ongoing concerns with grave disability with patient unable to perform basic feeding, hygiene and dressing activities without significant constant support.    Protective inpatient pyschiatric hospitalization required while a safe disposition plan is enacted: Yes    Patient stabilized and ready for discharge from inpatient psychiatric unit: No        STAFF:   Harry Enamorado NP  Psychiatry

## 2024-11-06 VITALS
OXYGEN SATURATION: 100 % | SYSTOLIC BLOOD PRESSURE: 149 MMHG | WEIGHT: 100 LBS | BODY MASS INDEX: 16.07 KG/M2 | DIASTOLIC BLOOD PRESSURE: 89 MMHG | RESPIRATION RATE: 18 BRPM | HEIGHT: 66 IN | TEMPERATURE: 98 F | HEART RATE: 130 BPM

## 2024-11-06 LAB
BASOPHILS # BLD AUTO: 0.07 K/UL (ref 0–0.2)
BASOPHILS NFR BLD: 1.4 % (ref 0–1.9)
DIFFERENTIAL METHOD BLD: ABNORMAL
EOSINOPHIL # BLD AUTO: 0.1 K/UL (ref 0–0.5)
EOSINOPHIL NFR BLD: 2.8 % (ref 0–8)
ERYTHROCYTE [DISTWIDTH] IN BLOOD BY AUTOMATED COUNT: 29 % (ref 11.5–14.5)
HCT VFR BLD AUTO: 27.9 % (ref 37–48.5)
HGB BLD-MCNC: 7.7 G/DL (ref 12–16)
IMM GRANULOCYTES # BLD AUTO: 0.02 K/UL (ref 0–0.04)
IMM GRANULOCYTES NFR BLD AUTO: 0.4 % (ref 0–0.5)
LYMPHOCYTES # BLD AUTO: 1.7 K/UL (ref 1–4.8)
LYMPHOCYTES NFR BLD: 34 % (ref 18–48)
MCH RBC QN AUTO: 17 PG (ref 27–31)
MCHC RBC AUTO-ENTMCNC: 27.6 G/DL (ref 32–36)
MCV RBC AUTO: 62 FL (ref 82–98)
MONOCYTES # BLD AUTO: 0.5 K/UL (ref 0.3–1)
MONOCYTES NFR BLD: 10.5 % (ref 4–15)
NEUTROPHILS # BLD AUTO: 2.5 K/UL (ref 1.8–7.7)
NEUTROPHILS NFR BLD: 50.9 % (ref 38–73)
NRBC BLD-RTO: 0 /100 WBC
PLATELET # BLD AUTO: 256 K/UL (ref 150–450)
PMV BLD AUTO: ABNORMAL FL (ref 9.2–12.9)
RBC # BLD AUTO: 4.52 M/UL (ref 4–5.4)
WBC # BLD AUTO: 4.94 K/UL (ref 3.9–12.7)

## 2024-11-06 PROCEDURE — 36415 COLL VENOUS BLD VENIPUNCTURE: CPT | Performed by: PSYCHIATRY & NEUROLOGY

## 2024-11-06 PROCEDURE — 90833 PSYTX W PT W E/M 30 MIN: CPT | Mod: ,,, | Performed by: STUDENT IN AN ORGANIZED HEALTH CARE EDUCATION/TRAINING PROGRAM

## 2024-11-06 PROCEDURE — 85025 COMPLETE CBC W/AUTO DIFF WBC: CPT | Performed by: PSYCHIATRY & NEUROLOGY

## 2024-11-06 PROCEDURE — 25000003 PHARM REV CODE 250: Performed by: PSYCHIATRY & NEUROLOGY

## 2024-11-06 PROCEDURE — 99239 HOSP IP/OBS DSCHRG MGMT >30: CPT | Mod: ,,, | Performed by: STUDENT IN AN ORGANIZED HEALTH CARE EDUCATION/TRAINING PROGRAM

## 2024-11-06 RX ORDER — FLUOXETINE HYDROCHLORIDE 40 MG/1
40 CAPSULE ORAL EVERY MORNING
Qty: 30 CAPSULE | Refills: 0 | Status: SHIPPED | OUTPATIENT
Start: 2024-11-07 | End: 2025-11-07

## 2024-11-06 RX ORDER — BUPROPION HYDROCHLORIDE 150 MG/1
150 TABLET ORAL DAILY
Qty: 30 TABLET | Refills: 0 | Status: SHIPPED | OUTPATIENT
Start: 2024-11-07 | End: 2025-11-07

## 2024-11-06 RX ORDER — HYDROXYZINE PAMOATE 50 MG/1
50 CAPSULE ORAL EVERY 6 HOURS PRN
Qty: 90 CAPSULE | Refills: 0 | Status: SHIPPED | OUTPATIENT
Start: 2024-11-06

## 2024-11-06 RX ADMIN — BUPROPION HYDROCHLORIDE 150 MG: 150 TABLET, EXTENDED RELEASE ORAL at 08:11

## 2024-11-06 RX ADMIN — FLUOXETINE HYDROCHLORIDE 40 MG: 20 CAPSULE ORAL at 08:11

## 2024-11-06 NOTE — PLAN OF CARE
Problem: Adult Behavioral Health Plan of Care  Goal: Optimized Coping Skills in Response to Life Stressors  Intervention: Promote Effective Coping Strategies  Flowsheets (Taken 11/5/2024 1600)  Supportive Measures:   active listening utilized   verbalization of feelings encouraged   problem-solving facilitated   self-responsibility promoted   self-reflection promoted   goal-setting facilitated       Behavior:  Engaged and oriented           Intervention: In this CBT-focused process group SW facilitated a group discussion on goal planning. Pt were asked to identify  goals, potential obstacles, things needed to achieve goals, and what can begin tomorrow to work towards goals.            Response: pt identified goals as following up with my therapist, spending time with family and friends, putting my needs before others and take care of myself, and remind myself that I'm strong and resilient, not allow negativity to overcome me. Pt identified negative thoughts (obstacles), believe in myself (need) and positive reminders (being tomorrow).           Plan: Continue to encourage pt to participate in process groups to verbalize feelings and develop healthy coping skills.

## 2024-11-06 NOTE — DISCHARGE SUMMARY
"Discharge Summary  Psychiatry    Admit Date: 11/2/2024    Discharge Date and Time:  11/06/2024 11:47 AM    Attending Physician: Jairo Amaral III, MD     Discharge Provider: Jairo Amaral III    Reason for Admission:  CHIEF COMPLAINT   Georgia Lares is a 33 y.o. female with a past psychiatric history of depression, SI and anxiety currently admitted to the inpatient unit with the following chief complaint: depression/SI, "I could not go anymore."    HPI   The patient was seen and examined. The chart was reviewed.     The patient presented to the ER on 11/2/2024 . Per staff notes:  -Patient comes into the emergency department by POV with complaints of SI. Patient states that she has been having suicidal ideation and "just wants to feel like herself again." Pt states she doesn't have a plan but has just been feeling depressed and "not wanting to be here anymore." Patient denies hallucinations and hallucinations.   -Georgia Lares is a 33 y.o. female with a PMHx including depression  presenting with SI.  Patient states that for the past 2 months she has been having worsening depressive symptoms.  States that she stopped taking her Wellbutrin and Prozac about 2 months ago.  Since then has been having worsening depressive thoughts, fatigue, and apathy.  States she was had additional stressors in life including a recent break up, family disagreements, and work issues.  States that she saw her therapist today who advised her to come to the ED for concern for her safety.  Patient states that she lives alone and there were no firearms in the house.  She reports passive SI but denies any active plan.  Otherwise reports no HI, or hallucinations.  No medications taken prior to arriva      The patient was medically cleared and admitted to the U.     The patient reports a h/o depression/anxiety. Issues recurred over the last few months have been progressively worsening. She notes significant family, occupational " "and relationship stressors. This "pushed things over the edge." She developed Si which prompted her presentation.     Procedures Performed: * No surgery found *    Hospital Course:    Patient was admitted to the inpatient psychiatry unit after being medically cleared in the ED. Chart and labs were reviewed. The patient was stabilized as follows:      Depression: pt counseled  - resumed/continue Prozac 20 mg po q q day-increase to 40 mg today  -resumed/continue Wellbutrin  mg po q day     Anxiety: pt counseled  -meds as above  -vistaril prn     Psychosocial stressors: pt counseled  -SW consulted to assist with resources      Cannabis abuse: pt counseled  -recommend cessation     H/o problematic alcohol use: pt counseled  -recommend continued cessation     Elevated TSH: pt counseled  -monitor for now  -med consulted   -f/u with PCP     Iron deficiency anemia: pt counseled  -monitor for now  -med consulted         The patient reports improved symptoms as documented. The patient is requesting discharge.The patient is hopeful, future oriented and goal directed. The patient readily discusses both short and long term goals. The patient can identify positive coping skills and social support. AIMS score was 0. During hospitalization, the patient was encouraged to go to both groups and individual counseling. Patient was monitored for any side effects. A meeting was held with multidisciplinary team prior to discharge and pt's diagnosis, current medications, and follow up were discussed. The patient has been compliant with treatment and can adequately attend to activities of daily living in an independent manner. The patient denies any side effects. The patient denies SI, HI, plan or intent for self harm or harm to others. The patient is no longer a danger to self or others nor gravely disabled disabled. Patient discharged  in stable condition with scheduled outpatient follow up.      Discussed diagnosis, risks and " benefits of proposed treatment vs alternative treatments vs no treatment, and potential side effects of these treatments.  The patient expresses understanding of the above and displays the capacity to agree with this treatment given said understanding.  Patient also agrees that, currently, the benefits outweigh the risks and would like to pursue treatment at this time.      Discharge MSE: stated age, casually dressed, well groomed.  No psychomotor agitation or retardation.  No abnormal involuntary movements.  Gait normal.  Speech normal, conversational.  Language fluent English. Mood fine.  Affect normal range, pleasant, euthymic.  Thought process linear.  Associations intact.  Denies suicidal or homicidal ideation.  Denies auditory hallucinations, paranoid ideation, ideas of reference.  Memory intact.  Attention intact.  Fund of knowledge intact.  Insight intact.  Judgment intact.  Alert and oriented to person, place, time.      Tobacco Usage:  Is patient a smoker? No  Does patient want prescription for Tobacco Cessation? No  Does patient want counseling for Tobacco Cessation? No    If patient would like to quit, then over the counter nicotine patch could be used. The patient could also follow up with his PCP or psychiatric provider for other alternatives.     Final Diagnoses:    Principal Problem: MDD, recurrent, severe without psychotic features   Secondary Diagnoses:     Unspecified Anxiety Disorder     Psychosocial stressors     Cannabis abuse  H/o problematic alcohol use     Elevated TSH  Iron deficiency anemia    Labs:  Admission on 11/02/2024   Component Date Value Ref Range Status    WBC 11/02/2024 8.51  3.90 - 12.70 K/uL Final    RBC 11/02/2024 4.14  4.00 - 5.40 M/uL Final    Hemoglobin 11/02/2024 7.0 (L)  12.0 - 16.0 g/dL Final    Hematocrit 11/02/2024 25.5 (L)  37.0 - 48.5 % Final    MCV 11/02/2024 62 (L)  82 - 98 fL Final    MCH 11/02/2024 16.9 (L)  27.0 - 31.0 pg Final    MCHC 11/02/2024 27.5 (L)  32.0  - 36.0 g/dL Final    RDW 11/02/2024 26.9 (H)  11.5 - 14.5 % Final    Platelets 11/02/2024 185  150 - 450 K/uL Final    MPV 11/02/2024 SEE COMMENT  9.2 - 12.9 fL Final    Immature Granulocytes 11/02/2024 0.5  0.0 - 0.5 % Final    Gran # (ANC) 11/02/2024 7.1  1.8 - 7.7 K/uL Final    Immature Grans (Abs) 11/02/2024 0.04  0.00 - 0.04 K/uL Final    Lymph # 11/02/2024 0.9 (L)  1.0 - 4.8 K/uL Final    Mono # 11/02/2024 0.4  0.3 - 1.0 K/uL Final    Eos # 11/02/2024 0.0  0.0 - 0.5 K/uL Final    Baso # 11/02/2024 0.05  0.00 - 0.20 K/uL Final    nRBC 11/02/2024 0  0 /100 WBC Final    Gran % 11/02/2024 83.4 (H)  38.0 - 73.0 % Final    Lymph % 11/02/2024 10.7 (L)  18.0 - 48.0 % Final    Mono % 11/02/2024 4.6  4.0 - 15.0 % Final    Eosinophil % 11/02/2024 0.2  0.0 - 8.0 % Final    Basophil % 11/02/2024 0.6  0.0 - 1.9 % Final    Differential Method 11/02/2024 Automated   Final    Iron 11/02/2024 374 (H)  30 - 160 ug/dL Final    TIBC 11/02/2024 529 (H)  250 - 450 ug/dL Final    Iron Saturation 11/02/2024 71 (H)  20 - 50 % Final    Ferritin 11/02/2024 5 (L)  20.0 - 300.0 ng/mL Final    Vitamin B-12 11/02/2024 581  210 - 950 pg/mL Final    Folate 11/02/2024 13.3  4.0 - 24.0 ng/mL Final    Hemoglobin A1C 11/03/2024 5.2  4.0 - 5.6 % Final    Estimated Avg Glucose 11/03/2024 103  68 - 131 mg/dL Final    Cholesterol 11/03/2024 110 (L)  120 - 199 mg/dL Final    Triglycerides 11/03/2024 59  30 - 150 mg/dL Final    HDL 11/03/2024 54  40 - 75 mg/dL Final    LDL Cholesterol 11/03/2024 44.2 (L)  63.0 - 159.0 mg/dL Final    HDL/Cholesterol Ratio 11/03/2024 49.1  20.0 - 50.0 % Final    Total Cholesterol/HDL Ratio 11/03/2024 2.0  2.0 - 5.0 Final    Non-HDL Cholesterol 11/03/2024 56  mg/dL Final    WBC 11/03/2024 4.30  3.90 - 12.70 K/uL Final    RBC 11/03/2024 4.21  4.00 - 5.40 M/uL Final    Hemoglobin 11/03/2024 7.1 (L)  12.0 - 16.0 g/dL Final    Hematocrit 11/03/2024 25.7 (L)  37.0 - 48.5 % Final    MCV 11/03/2024 61 (L)  82 - 98 fL Final    MCH  11/03/2024 16.9 (L)  27.0 - 31.0 pg Final    MCHC 11/03/2024 27.6 (L)  32.0 - 36.0 g/dL Final    RDW 11/03/2024 26.9 (H)  11.5 - 14.5 % Final    Platelets 11/03/2024 197  150 - 450 K/uL Final    MPV 11/03/2024 SEE COMMENT  9.2 - 12.9 fL Final    Immature Granulocytes 11/03/2024 0.2  0.0 - 0.5 % Final    Gran # (ANC) 11/03/2024 2.8  1.8 - 7.7 K/uL Final    Immature Grans (Abs) 11/03/2024 0.01  0.00 - 0.04 K/uL Final    Lymph # 11/03/2024 0.9 (L)  1.0 - 4.8 K/uL Final    Mono # 11/03/2024 0.5  0.3 - 1.0 K/uL Final    Eos # 11/03/2024 0.1  0.0 - 0.5 K/uL Final    Baso # 11/03/2024 0.03  0.00 - 0.20 K/uL Final    nRBC 11/03/2024 0  0 /100 WBC Final    Gran % 11/03/2024 65.9  38.0 - 73.0 % Final    Lymph % 11/03/2024 20.9  18.0 - 48.0 % Final    Mono % 11/03/2024 10.7  4.0 - 15.0 % Final    Eosinophil % 11/03/2024 1.6  0.0 - 8.0 % Final    Basophil % 11/03/2024 0.7  0.0 - 1.9 % Final    Differential Method 11/03/2024 Automated   Final    WBC 11/04/2024 4.24  3.90 - 12.70 K/uL Final    RBC 11/04/2024 4.39  4.00 - 5.40 M/uL Final    Hemoglobin 11/04/2024 7.4 (L)  12.0 - 16.0 g/dL Final    Hematocrit 11/04/2024 27.0 (L)  37.0 - 48.5 % Final    MCV 11/04/2024 62 (L)  82 - 98 fL Final    MCH 11/04/2024 16.9 (L)  27.0 - 31.0 pg Final    MCHC 11/04/2024 27.4 (L)  32.0 - 36.0 g/dL Final    RDW 11/04/2024 27.8 (H)  11.5 - 14.5 % Final    Platelets 11/04/2024 218  150 - 450 K/uL Final    MPV 11/04/2024 SEE COMMENT  9.2 - 12.9 fL Final    Immature Granulocytes 11/04/2024 0.2  0.0 - 0.5 % Final    Gran # (ANC) 11/04/2024 2.6  1.8 - 7.7 K/uL Final    Immature Grans (Abs) 11/04/2024 0.01  0.00 - 0.04 K/uL Final    Lymph # 11/04/2024 1.1  1.0 - 4.8 K/uL Final    Mono # 11/04/2024 0.5  0.3 - 1.0 K/uL Final    Eos # 11/04/2024 0.1  0.0 - 0.5 K/uL Final    Baso # 11/04/2024 0.03  0.00 - 0.20 K/uL Final    nRBC 11/04/2024 0  0 /100 WBC Final    Gran % 11/04/2024 60.2  38.0 - 73.0 % Final    Lymph % 11/04/2024 26.4  18.0 - 48.0 % Final     Mono % 11/04/2024 10.6  4.0 - 15.0 % Final    Eosinophil % 11/04/2024 1.9  0.0 - 8.0 % Final    Basophil % 11/04/2024 0.7  0.0 - 1.9 % Final    Differential Method 11/04/2024 Automated   Final    WBC 11/05/2024 4.75  3.90 - 12.70 K/uL Final    RBC 11/05/2024 4.47  4.00 - 5.40 M/uL Final    Hemoglobin 11/05/2024 7.6 (L)  12.0 - 16.0 g/dL Final    Hematocrit 11/05/2024 27.7 (L)  37.0 - 48.5 % Final    MCV 11/05/2024 62 (L)  82 - 98 fL Final    MCH 11/05/2024 17.0 (L)  27.0 - 31.0 pg Final    MCHC 11/05/2024 27.4 (L)  32.0 - 36.0 g/dL Final    RDW 11/05/2024 28.2 (H)  11.5 - 14.5 % Final    Platelets 11/05/2024 245  150 - 450 K/uL Final    MPV 11/05/2024 SEE COMMENT  9.2 - 12.9 fL Final    Immature Granulocytes 11/05/2024 0.2  0.0 - 0.5 % Final    Gran # (ANC) 11/05/2024 2.7  1.8 - 7.7 K/uL Final    Immature Grans (Abs) 11/05/2024 0.01  0.00 - 0.04 K/uL Final    Lymph # 11/05/2024 1.4  1.0 - 4.8 K/uL Final    Mono # 11/05/2024 0.5  0.3 - 1.0 K/uL Final    Eos # 11/05/2024 0.1  0.0 - 0.5 K/uL Final    Baso # 11/05/2024 0.05  0.00 - 0.20 K/uL Final    nRBC 11/05/2024 0  0 /100 WBC Final    Gran % 11/05/2024 57.7  38.0 - 73.0 % Final    Lymph % 11/05/2024 28.4  18.0 - 48.0 % Final    Mono % 11/05/2024 10.5  4.0 - 15.0 % Final    Eosinophil % 11/05/2024 2.1  0.0 - 8.0 % Final    Basophil % 11/05/2024 1.1  0.0 - 1.9 % Final    Aniso 11/05/2024 Slight   Final    Poik 11/05/2024 Slight   Final    Hypo 11/05/2024 Moderate   Final    Tear Drop Cells 11/05/2024 Occasional   Final    Differential Method 11/05/2024 Automated   Final    WBC 11/06/2024 4.94  3.90 - 12.70 K/uL Final    RBC 11/06/2024 4.52  4.00 - 5.40 M/uL Final    Hemoglobin 11/06/2024 7.7 (L)  12.0 - 16.0 g/dL Final    Hematocrit 11/06/2024 27.9 (L)  37.0 - 48.5 % Final    MCV 11/06/2024 62 (L)  82 - 98 fL Final    MCH 11/06/2024 17.0 (L)  27.0 - 31.0 pg Final    MCHC 11/06/2024 27.6 (L)  32.0 - 36.0 g/dL Final    RDW 11/06/2024 29.0 (H)  11.5 - 14.5 % Final     Platelets 11/06/2024 256  150 - 450 K/uL Final    MPV 11/06/2024 SEE COMMENT  9.2 - 12.9 fL Final    Immature Granulocytes 11/06/2024 0.4  0.0 - 0.5 % Final    Gran # (ANC) 11/06/2024 2.5  1.8 - 7.7 K/uL Final    Immature Grans (Abs) 11/06/2024 0.02  0.00 - 0.04 K/uL Final    Lymph # 11/06/2024 1.7  1.0 - 4.8 K/uL Final    Mono # 11/06/2024 0.5  0.3 - 1.0 K/uL Final    Eos # 11/06/2024 0.1  0.0 - 0.5 K/uL Final    Baso # 11/06/2024 0.07  0.00 - 0.20 K/uL Final    nRBC 11/06/2024 0  0 /100 WBC Final    Gran % 11/06/2024 50.9  38.0 - 73.0 % Final    Lymph % 11/06/2024 34.0  18.0 - 48.0 % Final    Mono % 11/06/2024 10.5  4.0 - 15.0 % Final    Eosinophil % 11/06/2024 2.8  0.0 - 8.0 % Final    Basophil % 11/06/2024 1.4  0.0 - 1.9 % Final    Differential Method 11/06/2024 Automated   Final   Admission on 11/01/2024, Discharged on 11/02/2024   Component Date Value Ref Range Status    HIV 1/2 Ag/Ab 11/01/2024 Non-reactive  Non-reactive Final    Hepatitis C Ab 11/01/2024 Non-reactive  Non-reactive Final    WBC 11/01/2024 7.89  3.90 - 12.70 K/uL Final    RBC 11/01/2024 4.22  4.00 - 5.40 M/uL Final    Hemoglobin 11/01/2024 6.3 (L)  12.0 - 16.0 g/dL Final    Hematocrit 11/01/2024 24.6 (L)  37.0 - 48.5 % Final    MCV 11/01/2024 58 (L)  82 - 98 fL Final    MCH 11/01/2024 14.9 (L)  27.0 - 31.0 pg Final    MCHC 11/01/2024 25.6 (L)  32.0 - 36.0 g/dL Final    RDW 11/01/2024 22.4 (H)  11.5 - 14.5 % Final    Platelets 11/01/2024 223  150 - 450 K/uL Final    MPV 11/01/2024 SEE COMMENT  9.2 - 12.9 fL Final    Immature Granulocytes 11/01/2024 1.3 (H)  0.0 - 0.5 % Final    Gran # (ANC) 11/01/2024 5.6  1.8 - 7.7 K/uL Final    Immature Grans (Abs) 11/01/2024 0.10 (H)  0.00 - 0.04 K/uL Final    Lymph # 11/01/2024 1.7  1.0 - 4.8 K/uL Final    Mono # 11/01/2024 0.5  0.3 - 1.0 K/uL Final    Eos # 11/01/2024 0.0  0.0 - 0.5 K/uL Final    Baso # 11/01/2024 0.05  0.00 - 0.20 K/uL Final    nRBC 11/01/2024 0  0 /100 WBC Final    Gran % 11/01/2024 70.4   38.0 - 73.0 % Final    Lymph % 11/01/2024 21.5  18.0 - 48.0 % Final    Mono % 11/01/2024 5.8  4.0 - 15.0 % Final    Eosinophil % 11/01/2024 0.4  0.0 - 8.0 % Final    Basophil % 11/01/2024 0.6  0.0 - 1.9 % Final    Differential Method 11/01/2024 Automated   Final    Sodium 11/01/2024 138  136 - 145 mmol/L Final    Potassium 11/01/2024 4.3  3.5 - 5.1 mmol/L Final    Chloride 11/01/2024 107  95 - 110 mmol/L Final    CO2 11/01/2024 20 (L)  23 - 29 mmol/L Final    Glucose 11/01/2024 87  70 - 110 mg/dL Final    BUN 11/01/2024 6  6 - 20 mg/dL Final    Creatinine 11/01/2024 0.7  0.5 - 1.4 mg/dL Final    Calcium 11/01/2024 10.0  8.7 - 10.5 mg/dL Final    Total Protein 11/01/2024 8.0  6.0 - 8.4 g/dL Final    Albumin 11/01/2024 4.7  3.5 - 5.2 g/dL Final    Total Bilirubin 11/01/2024 0.4  0.1 - 1.0 mg/dL Final    Alkaline Phosphatase 11/01/2024 58  40 - 150 U/L Final    AST 11/01/2024 16  10 - 40 U/L Final    ALT 11/01/2024 9 (L)  10 - 44 U/L Final    eGFR 11/01/2024 >60.0  >60 mL/min/1.73 m^2 Final    Anion Gap 11/01/2024 11  8 - 16 mmol/L Final    TSH 11/01/2024 4.097 (H)  0.400 - 4.000 uIU/mL Final    Specimen UA 11/01/2024 Urine, Clean Catch   Final    Color, UA 11/01/2024 Colorless (A)  Yellow, Straw, Jacinta Final    Appearance, UA 11/01/2024 Clear  Clear Final    pH, UA 11/01/2024 6.0  5.0 - 8.0 Final    Specific Gravity, UA 11/01/2024 1.010  1.005 - 1.030 Final    Protein, UA 11/01/2024 Negative  Negative Final    Glucose, UA 11/01/2024 Negative  Negative Final    Ketones, UA 11/01/2024 1+ (A)  Negative Final    Bilirubin (UA) 11/01/2024 Negative  Negative Final    Occult Blood UA 11/01/2024 Negative  Negative Final    Nitrite, UA 11/01/2024 Negative  Negative Final    Leukocytes, UA 11/01/2024 Negative  Negative Final    Alcohol, Serum 11/01/2024 <10  <10 mg/dL Final    Acetaminophen (Tylenol), Serum 11/01/2024 <3.0 (L)  10.0 - 20.0 ug/mL Final    POC Preg Test, Ur 11/01/2024 Negative  Negative Final      Acceptable 11/01/2024 Yes   Final    Free T4 11/01/2024 0.94  0.71 - 1.51 ng/dL Final    Group & Rh 11/01/2024 A POS   Final    Indirect Denice 11/01/2024 NEG   Final    Specimen Outdate 11/01/2024 11/04/2024 23:59   Final    UNIT NUMBER 11/01/2024 [id number]   Final    Product Code 11/01/2024 [id number]   Final    DISPENSE STATUS 11/01/2024 TRANSFUSED   Final    CODING SYSTEM 11/01/2024 [id number]   Final    Unit Blood Type Code 11/01/2024 6200   Final    Unit Blood Type 11/01/2024 A POS   Final    Unit Expiration 11/01/2024 [id number]   Final    CROSSMATCH INTERPRETATION 11/01/2024 Compatible   Final    Alcohol, Urine 11/01/2024 <10  <10 mg/dL Final    Benzodiazepines 11/01/2024 Negative  Negative Final    Methadone metabolites 11/01/2024 Negative  Negative Final    Cocaine (Metab.) 11/01/2024 Negative  Negative Final    Opiate Scrn, Ur 11/01/2024 Negative  Negative Final    Barbiturate Screen, Ur 11/01/2024 Negative  Negative Final    Amphetamine Screen, Ur 11/01/2024 Negative  Negative Final    THC 11/01/2024 Presumptive Positive (A)  Negative Final    Phencyclidine 11/01/2024 Negative  Negative Final    Creatinine, Urine 11/01/2024 53.0  15.0 - 325.0 mg/dL Final    Toxicology Information 11/01/2024 SEE COMMENT   Final         Discharged Condition: stable and improved; not currently a danger to self/others or gravely disabled    Disposition: Home or Self Care    Is patient being discharged on multiple neuroleptics? No    Follow Up/Patient Instructions:     Take all medications as prescribed.  Attend all psychiatric and medical follow up appointments.   Abstain from all drugs and alcohol.  Call the crisis line at: [phone] for help in a crisis and emergent situations or call 911 and Return to ED for any acute worsening of your condition including suicidal or homicidal ideations      Discharge Procedure Orders   Diet Adult Regular     Notify your health care provider if you experience any of the following:   temperature >100.4     Notify your health care provider if you experience any of the following:  persistent nausea and vomiting or diarrhea     Notify your health care provider if you experience any of the following:   Order Comments: Suicidal thoughts, homicidal thoughts, or any other changes in mental status  If you would like immediate help/crisis counseling, please call [phone] (TALK). Through this toll-free phone number for a network of crisis centers across the country. These centers staff their lines with people who are trained to listen and offer support to people in emotional crisis. If you are in an emergency, please call 325.     Notify your health care provider if you experience any of the following:  increased confusion or weakness     Notify your health care provider if you experience any of the following:  persistent dizziness, light-headedness, or visual disturbances     Activity as tolerated           Follow up apt: staff will schedule      Medications:  Reconciled Home Medications:      Medication List        START taking these medications      buPROPion 150 MG TB24 tablet  Commonly known as: WELLBUTRIN XL  Take 1 tablet (150 mg total) by mouth once daily.  Start taking on: November 7, 2024     hydrOXYzine pamoate 50 MG Cap  Commonly known as: VISTARIL  Take 1 capsule (50 mg total) by mouth every 6 (six) hours as needed (anxiety or insomnia).            CHANGE how you take these medications      FLUoxetine 40 MG capsule  Take 1 capsule (40 mg total) by mouth every morning.  Start taking on: November 7, 2024  What changed:   medication strength  how much to take              Psychotherapy:  Target symptoms: depression, anxiety , substance abuse  Why chosen therapy is appropriate versus another modality: relevant to diagnosis  Outcome monitoring methods: self-report  Therapeutic intervention type: supportive psychotherapy  Topics discussed/themes: building skills sets for symptom management,  symptom recognition, substance abuse  The patient's response to the intervention is accepting. The patient's progress toward treatment goals is fair.   Duration of intervention: 16 minutes.        Diet: regular     Activity as tolerated    Total time spent discharging patient: 34 minutes    Jairo Amaral III, MD  Psychiatry

## 2024-11-06 NOTE — NURSING
Patient discharged. Received discharged instructions, voiced understanding. Patient belongings verified and returned. Patient escorted from unit by staff in stable condition.

## 2024-11-06 NOTE — PLAN OF CARE
POC reviewed this shift and is on going. Patient is withdrawn, depressed, anxious, and showing pressured speech.  Denies Suicidal Ideation, Homicidal Ideation, Auditory Hallucinations, Visual Hallucinations, Tactile Hallucinations, Gustatory Hallucinations, and Delusions. Minimal interactions with peers,Self isolative. Pt continues to be medication compliant and staff will continue to monitor pt closely while on the unit.